# Patient Record
Sex: FEMALE | URBAN - METROPOLITAN AREA
[De-identification: names, ages, dates, MRNs, and addresses within clinical notes are randomized per-mention and may not be internally consistent; named-entity substitution may affect disease eponyms.]

---

## 2021-07-19 ENCOUNTER — HOSPITAL ENCOUNTER (OUTPATIENT)
Dept: PHYSICAL THERAPY | Age: 56
Discharge: HOME | End: 2021-07-19
Payer: COMMERCIAL

## 2021-07-19 DIAGNOSIS — M22.2X2 PATELLOFEMORAL PAIN SYNDROME OF LEFT KNEE: ICD-10-CM

## 2021-07-19 DIAGNOSIS — M25.562 ACUTE PAIN OF LEFT KNEE: ICD-10-CM

## 2021-07-19 PROCEDURE — 97161 PT EVAL LOW COMPLEX 20 MIN: CPT

## 2021-07-19 PROCEDURE — 97110 THERAPEUTIC EXERCISES: CPT

## 2021-07-19 NOTE — PROGRESS NOTES
Timothy Lerner  : 1965  Primary: Celi Koyanagi Of Zena Chau*  Secondary:  [facility] [address], [address] [facility], [address]  [phone]   [phone]       OUTPATIENT PHYSICAL THERAPY: Daily Treatment Note 2021  Visit Count:  1  ICD-10: Treatment Diagnosis: Pain in left knee (M25.562)   Difficulty in walking, not elsewhere classified (R26.2)  Muscle weakness (generalized) (M62.81)  Precautions/Allergies:   Patient has no known allergies. TREATMENT PLAN:  Effective Dates/Frequency/Duration: Twice per week from 2021 until 2021 (8 weeks). Pre-treatment Symptoms/Complaints:  See history above. Pain: Initial:   5/10 Post Session:  No change in pain reported   Medications Last Reviewed:  2021  Updated Objective Findings: See evaluation note from today   TREATMENT:   In addition to assessment today, the following treatments were provided:  Therapeutic Exercise: ( 9 minutes):  Exercises per grid below to improve mobility, strength and dynamic movement of left knee to improve functional mobility. Required minimal visual, verbal and manual cues to promote proper body alignment, promote proper body posture, promote proper body mechanics and promote proper body breathing techniques. Progressed resistance, range, repetitions and complexity of movement as indicated.     Date:  2021 Date:   Date:   Activity/Exercise Parameters Parameters    SLR in supine 10 reps/1 set each LE with cues to maintain knee extension throughout     Bridging in hooklying 10 reps/1 set with cues for slower eccentric lowering     Sidelying clamshells 10 reps/1 set each LE with cues for avoiding posterior lumbar rotation and for slow lowering                             *given in HEP  MedBridge Portal   Pt given following band colors: [] Yellow          [] Red          [] Ruby Granados          [] Blue          [] Brent Alvarado     Treatment/Session Summary:    Response to Treatment:  See assessment above. No adverse reactions/unusual changes observed/reported in clinical status this session. Communication/Consultation:  None today  Equipment provided today:  None today  Recommendations/Intent for next treatment session: Next visit will focus on manual therapy/modalities to reduce pain; kinesiotaping for medial patellar glide; strengthening/stretching surrounding L knee.     Total Treatment Billable Duration:  9 minutes  PT Patient Time In/Time Out  Time In: 1430  Time Out: 280 Melissa Yanez DPT    Future Appointments   Date Time Provider Gabrielle Malone   7/23/2021  3:15 PM Bradford Fried, [facility]   7/26/2021  2:30 PM Samanta Vee, [facility] [place]   7/29/2021  1:45 PM Samanta Vee, [facility]   8/2/2021  1:00 PM Bradford Fried, [facility]   8/5/2021  1:00 PM Cristina DAVIS, [facility]   8/30/2021  1:45 PM MD SANDIP Christianson        Visit Approval Visit # Therapist initials Date A NS / Cx < 24 hr >24 hr Cx Comments   30 visits total 1  7/19/2021 [x]  [] [] Initial evaluation       [] [] []        [] [] []        [] [] []        [] [] []        [] [] []        [] [] []        [] [] []        [] [] []        [] [] []        [] [] []        [] [] []        [] [] []        [] [] []        [] [] []        [] [] []        [] [] []        [] [] []

## 2021-07-23 ENCOUNTER — HOSPITAL ENCOUNTER (OUTPATIENT)
Dept: PHYSICAL THERAPY | Age: 56
Discharge: HOME | End: 2021-07-23
Payer: COMMERCIAL

## 2021-07-23 PROCEDURE — 97140 MANUAL THERAPY 1/> REGIONS: CPT

## 2021-07-23 PROCEDURE — 97110 THERAPEUTIC EXERCISES: CPT

## 2021-07-23 NOTE — PROGRESS NOTES
Mariela Tonny  : 1965  Primary: Jus Schoharie Of Zena Larykit*  Secondary:  [facility] [address] [place] [address] 68, 101 [facility] [place], [facility]  [phone]   [phone]       OUTPATIENT PHYSICAL THERAPY: Daily Treatment Note 2021  Visit Count:  2  ICD-10: Treatment Diagnosis: Pain in left knee (M25.562)   Difficulty in walking, not elsewhere classified (R26.2)  Muscle weakness (generalized) (M62.81)  Precautions/Allergies:   Patient has no known allergies. TREATMENT PLAN:  Effective Dates/Frequency/Duration: Twice per week from 2021 until 2021 (8 weeks). Pre-treatment Symptoms/Complaints:  Pt states she has been on a regimen of taking aleve every day for a week per MD orders  Pain: Initial:   5/10 Post Session: no number verbalized, but pt reporting less pain   Medications Last Reviewed:  2021  Updated Objective Findings: None Today   TREATMENT:   Therapeutic Exercise: ( 25 minutes):  Exercises per grid below to improve mobility, strength and dynamic movement of left knee to improve functional mobility. Required minimal visual, verbal and manual cues to promote proper body alignment, promote proper body posture, promote proper body mechanics and promote proper body breathing techniques. Progressed resistance, range, repetitions and complexity of movement as indicated.     Date:  2021 Date:  2021 Date:   Activity/Exercise Parameters Parameters    SLR in supine* 10 reps/1 set each LE with cues to maintain knee extension throughout 20 reps/1 set each LE with cues to maintain knee extension throughout    Bridging in hooklying* 10 reps/1 set with cues for slower eccentric lowering     Sidelying clamshells* 10 reps/1 set each LE with cues for avoiding posterior lumbar rotation and for slow lowering     Ankle plantarflexor stretch on incline board  30 second hold x 3 reps with knees extended then flexed    Nustep   L4 resistance for 10 minutes with B LEs only to increase strength/endurance; cues to avoid excessive hip IR at L to reduce L knee pain    Sidelying hip abduction*  20 reps/1 set each LE with cues for maintaining knee extension but avoiding knee hyperextension with cues to avoid hip ER as well    Prone hip extension*  20 reps/1 set each LE with cues for slow movement and avoiding bringing hip up off of bed                            *given in HEP  MedBridge Portal   Pt given following band colors: [] Yellow          [] Red          [] Green          [] Blue          [] Grey     Manual therapy (15 minutes): With pt in supported supine position:   - applied kinesiotape to L knee with medial patellar glide in horseshoe position as well as horizontal piece of tape across middle of knee (50-75% stretch) to reduce pain and facilitate improved alignment of patella  With pt in supported R sidelying position:   - STM and connective tissue mobilization with green theraflex roller to L ITB and TFL as well as to portions of L quad and hamstring to reduce muscular tightness and pain    Treatment/Session Summary:    Response to Treatment:  Pt reporting less strain at L knee after medial patellar taping was performed this session. Gave pt updated HEP to include new exercises. Pt reporting good understanding of exercises. No adverse reactions/unusual changes observed/reported in clinical status this session. Communication/Consultation:  None today  Equipment provided today:  None today  Recommendations/Intent for next treatment session: Next visit will focus on manual therapy/modalities to reduce pain; kinesiotaping for medial patellar glide; strengthening/stretching surrounding L knee.     Total Treatment Billable Duration:  40 minutes  PT Patient Time In/Time Out  Time In: 9641  Time Out: 100 [facility], DPT    Future Appointments   Date Time Provider Gabrielle Malone   7/26/2021  2:30 PM Helga Ferrari PTA [facility] 7/29/2021  1:45 PM Lashaun Dawson PTA [facility]   8/2/2021  1:00 PM Joana Chow DPT [facility]   8/5/2021  1:00 PM Ray DAVIS DPT [facility]   8/30/2021  1:45 PM Liborio Murillo MD [facility] POA        Visit Approval Visit # Therapist initials Date A NS / Cx < 24 hr >24 hr Cx Comments   30 visits total 1  7/19/2021 [x]  [] [] Initial evaluation    2  7/23/2021 [x] [] []        [] [] []        [] [] []        [] [] []        [] [] []        [] [] []        [] [] []        [] [] []        [] [] []        [] [] []        [] [] []        [] [] []        [] [] []        [] [] []        [] [] []        [] [] []        [] [] []

## 2021-07-26 ENCOUNTER — HOSPITAL ENCOUNTER (OUTPATIENT)
Dept: PHYSICAL THERAPY | Age: 56
Discharge: HOME | End: 2021-07-26
Payer: COMMERCIAL

## 2021-07-26 PROCEDURE — 97140 MANUAL THERAPY 1/> REGIONS: CPT

## 2021-07-26 PROCEDURE — 97110 THERAPEUTIC EXERCISES: CPT

## 2021-07-26 NOTE — PROGRESS NOTES
Es Harmon  : 1965  Primary: Chandana Loosen Of Zena Chau*  Secondary:  [facility]  [address], [address] [place] [address], [place] [address] [address] [facility]  [phone]   [phone]       OUTPATIENT PHYSICAL THERAPY: Daily Treatment Note 2021  Visit Count:  3  ICD-10: Treatment Diagnosis: Pain in left knee (M25.562)   Difficulty in walking, not elsewhere classified (R26.2)  Muscle weakness (generalized) (M62.81)  Precautions/Allergies:   Patient has no known allergies. TREATMENT PLAN:  Effective Dates/Frequency/Duration: Twice per week from 2021 until 2021 (8 weeks). Pre-treatment Symptoms/Complaints:  Patient states tape helped over the weekend. Pain: Initial:   4/10 Post Session: no number verbalized, but pt reporting less pain   Medications Last Reviewed:  2021  Updated Objective Findings: None Today   TREATMENT:   Therapeutic Exercise: ( 25 minutes):  Exercises per grid below to improve mobility, strength and dynamic movement of left knee to improve functional mobility. Required minimal visual, verbal and manual cues to promote proper body alignment, promote proper body posture, promote proper body mechanics and promote proper body breathing techniques. Progressed resistance, range, repetitions and complexity of movement as indicated.     Date:  2021 Date:  2021 Date:  2021   Activity/Exercise Parameters Parameters    SLR in supine* 10 reps/1 set each LE with cues to maintain knee extension throughout 20 reps/1 set each LE with cues to maintain knee extension throughout X 20 B with good quad set    Bridging in hooklying* 10 reps/1 set with cues for slower eccentric lowering  2 x 10 with yellow band to keep patellar alignment    Sidelying clamshells* 10 reps/1 set each LE with cues for avoiding posterior lumbar rotation and for slow lowering  2 x 10 B with yellow band    Ankle plantarflexor stretch on incline board  30 second hold x 3 reps with knees extended then flexed 3 x 30 sec hold with knees extended    Nustep   L4 resistance for 10 minutes with B LEs only to increase strength/endurance; cues to avoid excessive hip IR at L to reduce L knee pain L4 resistance for 10 minutes with B LEs only to increase strength/endurance; cues to avoid excessive hip IR at L to reduce L knee pain   Sidelying hip abduction*  20 reps/1 set each LE with cues for maintaining knee extension but avoiding knee hyperextension with cues to avoid hip ER as well    Prone hip extension*  20 reps/1 set each LE with cues for slow movement and avoiding bringing hip up off of bed                            *given in HEP  MedBridge Portal   Pt given following band colors: [] Yellow          [] Red          [] Green          [] Blue          [] Grey     Manual therapy (30 minutes): With pt in supported supine position:(at end of session assisted by Carmen Ochoa DPT )   - applied kinesiotape to L knee with medial patellar glide in horseshoe position as well as horizontal piece of tape across middle of knee (50-75% stretch) to reduce pain and facilitate improved alignment of patella  With pt in supported R sidelying position:   - STM and connective tissue mobilization with hands to L ITB and TFL as well as to portions of L quad and hamstring to reduce muscular tightness and pain. Some cross friction and kneading required to decrease tightness. Treatment/Session Summary:    Response to Treatment:  Patient encouraged to be aware of patellar alignment with activities. Added yellow band to the exercises as listed above. Pt reporting good understanding of exercises. No adverse reactions/unusual changes observed/reported in clinical status this session.   Communication/Consultation:  None today  Equipment provided today:  None today  Recommendations/Intent for next treatment session: Next visit will focus on manual therapy/modalities to reduce pain; kinesiotaping for medial patellar glide; strengthening/stretching surrounding L knee.     Total Treatment Billable Duration:  55 minutes  PT Patient Time In/Time Out  Time In: 1900  Time Out: [address] [place], [place]    Future Appointments   Date Time Provider Gabrielle Malone   7/29/2021  1:45 PM Dago Millan [facility]   8/2/2021  1:00 PM PATRIC Arrington [facility]   8/5/2021  1:00 PM PATRIC Arrington   8/9/2021  [facility]   8/12/2021  9:30 AM Brenda Jimenez DPT [facility]   8/30/2021  1:45 PM Braydon Schmidt MD [facility]        Visit Approval Visit # Therapist initials Date A NS / Cx < 24 hr >24 hr Cx Comments   30 visits total 1  7/19/2021 [x]  [] [] Initial evaluation    2  7/23/2021 [x] [] []     3 PDE 7- [x] [] []        [] [] []        [] [] []        [] [] []        [] [] []        [] [] []        [] [] []        [] [] []        [] [] []        [] [] []        [] [] []        [] [] []        [] [] []        [] [] []        [] [] []        [] [] []

## 2021-07-29 ENCOUNTER — HOSPITAL ENCOUNTER (OUTPATIENT)
Dept: PHYSICAL THERAPY | Age: 56
Discharge: HOME | End: 2021-07-29
Payer: COMMERCIAL

## 2021-07-29 PROCEDURE — 97140 MANUAL THERAPY 1/> REGIONS: CPT

## 2021-07-29 PROCEDURE — 97110 THERAPEUTIC EXERCISES: CPT

## 2021-07-29 NOTE — PROGRESS NOTES
Naldo Bowersseveriano  : 1965  Primary: Ivette Jasso Of Zena Chau*  [facility]:  [facility], [address] [facility], [place] [address] [address] [facility]  [phone]   [phone]       OUTPATIENT PHYSICAL THERAPY: Daily Treatment Note 2021  Visit Count:  4  ICD-10: Treatment Diagnosis: Pain in left knee (M25.562)   Difficulty in walking, not elsewhere classified (R26.2)  Muscle weakness (generalized) (M62.81)  Precautions/Allergies:   Patient has no known allergies. TREATMENT PLAN:  Effective Dates/Frequency/Duration: Twice per week from 2021 until 2021 (8 weeks). Pre-treatment Symptoms/Complaints:  Patient reports she is aggravated due to the last 2 days she has had constant pain. She reports she is doing her exercises and some house work, but no extra walking. Pain: Initial:   5/10 Post Session: no number verbalized, but pt reporting less pain   Medications Last Reviewed:  2021  Updated Objective Findings: None Today   TREATMENT:   Therapeutic Exercise: ( 30 minutes):  Exercises per grid below to improve mobility, strength and dynamic movement of left knee to improve functional mobility. Required minimal visual, verbal and manual cues to promote proper body alignment, promote proper body posture, promote proper body mechanics and promote proper body breathing techniques. Progressed resistance, range, repetitions and complexity of movement as indicated.     Date:  2021 Date:  2021 Date:  2021 Date:  2021   Activity/Exercise Parameters Parameters     SLR in supine* 10 reps/1 set each LE with cues to maintain knee extension throughout 20 reps/1 set each LE with cues to maintain knee extension throughout X 20 B with good quad set  X 20 B  With good quad set    Bridging in hooklying* 10 reps/1 set with cues for slower eccentric lowering  2 x 10 with yellow band to keep patellar alignment  X 20    Sidelying clamshells* 10 reps/1 set each LE with cues for avoiding posterior lumbar rotation and for slow lowering  2 x 10 B with yellow band  X 20 B   Ankle plantarflexor stretch on incline board  30 second hold x 3 reps with knees extended then flexed 3 x 30 sec hold with knees extended  3 x 30 sec hold with knees extended    Nustep   L4 resistance for 10 minutes with B LEs only to increase strength/endurance; cues to avoid excessive hip IR at L to reduce L knee pain L4 resistance for 10 minutes with B LEs only to increase strength/endurance; cues to avoid excessive hip IR at L to reduce L knee pain Level 3  10 minutes with B LE's only to increase strength and endurance    Sidelying hip abduction*  20 reps/1 set each LE with cues for maintaining knee extension but avoiding knee hyperextension with cues to avoid hip ER as well  X 20 B   Prone hip extension*  20 reps/1 set each LE with cues for slow movement and avoiding bringing hip up off of bed     TKE    Yellow   X 10 L                        *given in HEP  MedBridge Portal   Pt given following band colors: [] Yellow          [] Red          [] Green          [] Blue          [] Grey     Manual therapy (27 minutes): With pt in supported supine position:   - applied kinesiotape to L knee with medial patellar glide in horseshoe position as well as horizontal piece of tape across middle of knee (50-75% stretch) to reduce pain and facilitate improved alignment of patella  With pt in supported R sidelying position:   - STM and connective tissue mobilization with hands to L anterior and medial aspect of her knee to reduce muscular tightness and pain. Some cross friction and kneading required to decrease tightness. Treatment/Session Summary:    Response to Treatment:  Patient encouraged to be aware of L LE when doing house work. She did report less pain and soreness upon departure. Pt reporting good understanding of exercises.         No adverse reactions/unusual changes observed/reported in clinical status this session. Communication/Consultation:  None today  Equipment provided today:  None today  Recommendations/Intent for next treatment session: Next visit will focus on manual therapy/modalities to reduce pain; kinesiotaping for medial patellar glide; strengthening/stretching surrounding L knee.     Total Treatment Billable Duration:  57 minutes  PT Patient Time In/Time Out  Time In: 5839  Time Out: 541 [facility]    Future Appointments   Date Time Provider Gabrielle Kira   8/2/2021  1:00 PM Pat Sorensen DPT [facility]   8/5/2021  1:00 PM Pat Sorensen DPT [facility]   8/9/2021  1:00 PM Chloe Lundberg PTA [facility]   8/12/2021  9:30 AM Pat Sorensen DPT [facility]   8/30/2021  1:45 PM MD SANDIP Ugarte        Visit Approval Visit # Therapist initials Date A NS / Cx < 24 hr >24 hr Cx Comments   30 visits total 1  7/19/2021 [x]  [] [] Initial evaluation    2  7/23/2021 [x] [] []     3 PDE 7- [x] [] []     4 PDE 7- [] [] []        [] [] []        [] [] []        [] [] []        [] [] []        [] [] []        [] [] []        [] [] []        [] [] []        [] [] []        [] [] []        [] [] []        [] [] []        [] [] []        [] [] []

## 2021-08-02 ENCOUNTER — HOSPITAL ENCOUNTER (OUTPATIENT)
Dept: PHYSICAL THERAPY | Age: 56
Discharge: HOME | End: 2021-08-02
Payer: COMMERCIAL

## 2021-08-02 PROCEDURE — 97140 MANUAL THERAPY 1/> REGIONS: CPT

## 2021-08-02 PROCEDURE — 97110 THERAPEUTIC EXERCISES: CPT

## 2021-08-02 NOTE — PROGRESS NOTES
Keira Brianna  : 1965  Primary: Marley Dc Of Zena Chau*  [facility]:  [facility] [address] [facility], [place] [address] [address] [facility]  [phone]   [phone]       OUTPATIENT PHYSICAL THERAPY: Daily Treatment Note 2021  Visit Count:  5  ICD-10: Treatment Diagnosis: Pain in left knee (M25.562)   Difficulty in walking, not elsewhere classified (R26.2)  Muscle weakness (generalized) (M62.81)  Precautions/Allergies:   Patient has no known allergies. TREATMENT PLAN:  Effective Dates/Frequency/Duration: Twice per week from 2021 until 2021 (8 weeks). Pre-treatment Symptoms/Complaints:  Patient reports she has been feeling better since last session - states she has been taking it easy, icing a lot, and using a pillow between her knees when she sleeps  Pain: Initial:   3/10 Post Session: no number verbalized   Medications Last Reviewed:  2021  Updated Objective Findings: None Today   TREATMENT:   Therapeutic Exercise: ( 28 minutes):  Exercises per grid below to improve mobility, strength and dynamic movement of left knee to improve functional mobility. Required minimal visual, verbal and manual cues to promote proper body alignment, promote proper body posture, promote proper body mechanics and promote proper body breathing techniques. Progressed resistance, range, repetitions and complexity of movement as indicated.     Date:  2021 Date:  2021 Date:  2021 Date:  2021   Activity/Exercise Parameters      SLR in supine* 20 reps/1 set each LE with cues to maintain knee extension throughout X 20 B with good quad set  X 20 B  With good quad set  No weight quickly progressing to 1.5 ankle weight; 20 reps/1 set L LE with cues for slow lowering   Bridging in hooklying*  2 x 10 with yellow band to keep patellar alignment  X 20  15 reps/1 set with B LEs on green exercise ball for increased challenge   Sidelying clamshells*  2 x 10 B with yellow band  X 20 B    Ankle plantarflexor stretch on incline board 30 second hold x 3 reps with knees extended then flexed 3 x 30 sec hold with knees extended  3 x 30 sec hold with knees extended  3 x 30 sec hold with knees extended    Nustep  L4 resistance for 10 minutes with B LEs only to increase strength/endurance; cues to avoid excessive hip IR at L to reduce L knee pain L4 resistance for 10 minutes with B LEs only to increase strength/endurance; cues to avoid excessive hip IR at L to reduce L knee pain Level 3  10 minutes with B LE's only to increase strength and endurance  L4 resistance for 10 minutes with B LEs only to increase strength/endurance; cues to avoid excessive hip IR at L to reduce L knee pain   Sidelying hip abduction* 20 reps/1 set each LE with cues for maintaining knee extension but avoiding knee hyperextension with cues to avoid hip ER as well  X 20 B    Prone hip extension* 20 reps/1 set each LE with cues for slow movement and avoiding bringing hip up off of bed   20 reps/1 set L LE with cues for slow movement and avoiding bringing hip up off of bed   TKE   Yellow   X 10 L    LAQ in sitting    2 lb ankle weight; 20 reps/1 set L LE with cues to avoid hyperextension; cues for slow lowering   Reverse clamshells in sidelying*    20 reps/1 set L LE with cues for slow lowering   Leg press machine    15 lb resistance; 25 reps/1 set with cues to avoid hip IR throughout                        *given in HEP  MedBridge Portal   Pt given following band colors: [] Yellow          [] Red          [] Green          [] Blue          [] Grey     Manual therapy (12 minutes):   With pt in supported supine position:   - applied kinesiotape to anterior L knee with medial patellar glide in horseshoe position as well as horizontal piece of tape across middle of knee (50-75% stretch) to reduce pain and facilitate improved alignment of patella  With pt in supported prone position:   - applied kinesiotape to posterior L knee in X position at 25% stretch to encourage improved alignment of L knee (less hyperextension)     Treatment/Session Summary:    Response to Treatment:  Patient reporting less pain overall this session. She is demonstrating improved technique with LE strengthening exercises with less L knee hyperextension noted, and is able to perform more challenging exercises as well with no reports of increased pain. Will continue to progress LE strengthening as tolerated by pt. No adverse reactions/unusual changes observed/reported in clinical status this session. Communication/Consultation:  None today  Equipment provided today:  None today  Recommendations/Intent for next treatment session: Next visit will focus on manual therapy/modalities to reduce pain; kinesiotaping for medial patellar glide; strengthening/stretching surrounding L knee.     Total Treatment Billable Duration:  40 minutes  PT Patient Time In/Time Out  Time In: [facility]  Time Out: [address] [place] [address] PATRIC Miller    Future Appointments   Date Time Provider Gabrielle Malone   8/5/2021  1:00 PM Koko Espinoza DPT [facility]   8/9/2021  1:00 PM Guillermo Harper PTA [facility]   8/12/2021  9:30 AM PATRIC Singh   8/16/2021  1:00 PM PATRIC Singh   8/19/2021  1:00 PM Guillermo Harper [facility]   8/30/2021  1:45 PM Raul Song MD [facility] POA        Visit Approval Visit # Therapist initials Date A NS / Cx < 24 hr >24 hr Cx Comments   30 visits total 1  7/19/2021 [x]  [] [] Initial evaluation    2  7/23/2021 [x] [] []     3 PDE 7- [x] [] []     4 PDE 7- [x] [] []     5  8/2/2021 [x] [] []        [] [] []        [] [] []        [] [] []        [] [] []        [] [] []        [] [] []        [] [] []        [] [] []        [] [] []        [] [] []        [] [] []        [] [] []        [] [] []

## 2021-08-05 ENCOUNTER — HOSPITAL ENCOUNTER (OUTPATIENT)
Dept: PHYSICAL THERAPY | Age: 56
Discharge: HOME | End: 2021-08-05
Payer: COMMERCIAL

## 2021-08-05 PROCEDURE — 97110 THERAPEUTIC EXERCISES: CPT

## 2021-08-05 NOTE — PROGRESS NOTES
Ulises Calhoun  : 1965  Primary: Ze Martin Of Zena Chau*  Secondary:  [facility] [address], [place], [facility]  [phone]   [phone]       OUTPATIENT PHYSICAL THERAPY: Daily Treatment Note 2021  Visit Count:  6  ICD-10: Treatment Diagnosis: Pain in left knee (M25.562)   Difficulty in walking, not elsewhere classified (R26.2)  Muscle weakness (generalized) (M62.81)  Precautions/Allergies:   Patient has no known allergies. TREATMENT PLAN:  Effective Dates/Frequency/Duration: Twice per week from 2021 until 2021 (8 weeks). Pre-treatment Symptoms/Complaints:  Patient states she feels about the same today as she did earlier this week  Pain: Initial:   3/10 Post Session: pt stating no change in pain   Medications Last Reviewed:  2021  Updated Objective Findings: None Today   TREATMENT:   Therapeutic Exercise: ( 40 minutes):  Exercises per grid below to improve mobility, strength and dynamic movement of left knee to improve functional mobility. Required minimal visual, verbal and manual cues to promote proper body alignment, promote proper body posture, promote proper body mechanics and promote proper body breathing techniques. Progressed resistance, range, repetitions and complexity of movement as indicated.     Date:  2021 Date:  2021 Date:  2021 Date:  2021   Activity/Exercise       SLR in supine* X 20 B with good quad set  X 20 B  With good quad set  No weight quickly progressing to 1.5 ankle weight; 20 reps/1 set L LE with cues for slow lowering 2 ankle weight; 20 reps/1 set L LE with cues for slow lowering; cues to avoid hyperextension at L knee   Bridging in hooklying* 2 x 10 with yellow band to keep patellar alignment  X 20  15 reps/1 set with B LEs on green exercise ball for increased challenge 20 reps/1 set with B LEs on green exercise ball for increased challenge and to avoid hip IR Sidelying clamshells* 2 x 10 B with yellow band  X 20 B     Ankle plantarflexor stretch on incline board 3 x 30 sec hold with knees extended  3 x 30 sec hold with knees extended  3 x 30 sec hold with knees extended  3 x 30 sec hold with knees extended; 3rd notch from bottom   Nustep  L4 resistance for 10 minutes with B LEs only to increase strength/endurance; cues to avoid excessive hip IR at L to reduce L knee pain Level 3  10 minutes with B LE's only to increase strength and endurance  L4 resistance for 10 minutes with B LEs only to increase strength/endurance; cues to avoid excessive hip IR at L to reduce L knee pain L5 resistance for 10 minutes with B LEs only to increase strength/endurance   Sidelying hip abduction*  X 20 B     Prone hip extension*   20 reps/1 set L LE with cues for slow movement and avoiding bringing hip up off of bed    TKE  Yellow   X 10 L     LAQ in sitting   2 lb ankle weight; 20 reps/1 set L LE with cues to avoid hyperextension; cues for slow lowering    Reverse clamshells in sidelying*   20 reps/1 set L LE with cues for slow lowering    Leg press machine   15 lb resistance; 25 reps/1 set with cues to avoid hip IR throughout    Hamstring stretch in supine*    30 second hold x 3 reps/1 set each LE; pt stretching with strap   Jose test stretch for hip flexors/quads*    30 second hold x 1 rep/1 set each LE; pt providing passive overpressure with strap   Hip abductor stretch in supine*    30 second hold x 3 reps/1 set; pt stretching with strap                               *given in HEP  Med[employer] Portal   Pt given following band colors: [] Yellow          [] Red          [] Green          [] Blue          [] Grey     Manual therapy (5 minutes):   With pt in supported supine position:   - applied kinesiotape to anterior L knee with medial patellar glide in horseshoe position as well as horizontal piece of tape across middle of knee (75-80% stretch) to reduce pain and facilitate improved alignment of patella    Treatment/Session Summary:    Response to Treatment:  Patient demonstrating improved ability to perform more resistance with above listed strengthening exercises with good technique this session. Gave pt HEP of stretches to work on at home. No adverse reactions/unusual changes observed/reported in clinical status this session. Communication/Consultation:  None today  Equipment provided today:  None today  Recommendations/Intent for next treatment session: Next visit will focus on manual therapy/modalities to reduce pain; kinesiotaping for medial patellar glide; strengthening/stretching surrounding L knee.     Total Treatment Billable Duration:  45 minutes  PT Patient Time In/Time Out  Time In: 6899  Time Out: 221 [facility], DPT    Future Appointments   Date Time Provider Gabrielle Malone   8/9/2021  1:00 PM Samanta Vee [facility] Darcy Chen   8/12/2021  9:30 AM PATRIC Molina   8/16/2021  1:00 PM PATRIC Molina   8/19/2021  1:00 [facility]   8/30/2021  1:45 PM Tuan Fu MD [facility] POA        Visit Approval Visit # Therapist initials Date A NS / Cx < 24 hr >24 hr Cx Comments   30 visits total 1  7/19/2021 [x]  [] [] Initial evaluation    2  7/23/2021 [x] [] []     3 PDE 7- [x] [] []     4 PDE 7- [x] [] []     5  8/2/2021 [x] [] []     6  8/5/2021 [x] [] []        [] [] []        [] [] []        [] [] []        [] [] []        [] [] []        [] [] []        [] [] []        [] [] []        [] [] []        [] [] []        [] [] []        [] [] []

## 2021-08-09 ENCOUNTER — HOSPITAL ENCOUNTER (OUTPATIENT)
Dept: PHYSICAL THERAPY | Age: 56
Discharge: HOME | End: 2021-08-09
Payer: COMMERCIAL

## 2021-08-09 PROCEDURE — 97140 MANUAL THERAPY 1/> REGIONS: CPT

## 2021-08-09 PROCEDURE — 97110 THERAPEUTIC EXERCISES: CPT

## 2021-08-09 NOTE — PROGRESS NOTES
Damir Hsu  : 1965  Primary: Meli Boby Of Zena Chau*  Secondary:  [facility], [address], [place], [facility]  [phone]   [phone]       OUTPATIENT PHYSICAL THERAPY: Daily Treatment Note 2021  Visit Count:  7  ICD-10: Treatment Diagnosis: Pain in left knee (M25.562)   Difficulty in walking, not elsewhere classified (R26.2)  Muscle weakness (generalized) (M62.81)  Precautions/Allergies:   Patient has no known allergies. TREATMENT PLAN:  Effective Dates/Frequency/Duration: Twice per week from 2021 until 2021 (8 weeks). Pre-treatment Symptoms/Complaints:  Patient states she still has moments of pain when getting in an out of the car/truck. Pain: Initial:  3/10 Post Session: 3/10. Medications Last Reviewed:  2021  Updated Objective Findings: None Today   TREATMENT:   Therapeutic Exercise: (  25 minutes):  Exercises per grid below to improve mobility, strength and dynamic movement of left knee to improve functional mobility. Required minimal visual, verbal and manual cues to promote proper body alignment, promote proper body posture, promote proper body mechanics and promote proper body breathing techniques. Progressed resistance, range, repetitions and complexity of movement as indicated.     Date:  2021 Date:  2021 Date:  2021 Date:  2021 Date:  2021   Activity/Exercise        SLR in supine* X 20 B with good quad set  X 20 B  With good quad set  No weight quickly progressing to 1.5 ankle weight; 20 reps/1 set L LE with cues for slow lowering 2 ankle weight; 20 reps/1 set L LE with cues for slow lowering; cues to avoid hyperextension at L knee    Bridging in hooklying* 2 x 10 with yellow band to keep patellar alignment  X 20  15 reps/1 set with B LEs on green exercise ball for increased challenge 20 reps/1 set with B LEs on green exercise ball for increased challenge and to avoid hip IR    Sidelying clamshells* 2 x 10 B with yellow band  X 20 B      Ankle plantarflexor stretch on incline board 3 x 30 sec hold with knees extended  3 x 30 sec hold with knees extended  3 x 30 sec hold with knees extended  3 x 30 sec hold with knees extended; 3rd notch from bottom    Nustep  L4 resistance for 10 minutes with B LEs only to increase strength/endurance; cues to avoid excessive hip IR at L to reduce L knee pain Level 3  10 minutes with B LE's only to increase strength and endurance  L4 resistance for 10 minutes with B LEs only to increase strength/endurance; cues to avoid excessive hip IR at L to reduce L knee pain L5 resistance for 10 minutes with B LEs only to increase strength/endurance 10 minutes   Level 5   Sidelying hip abduction*  X 20 B      Prone hip extension*   20 reps/1 set L LE with cues for slow movement and avoiding bringing hip up off of bed     TKE  Yellow   X 10 L      LAQ in sitting   2 lb ankle weight; 20 reps/1 set L LE with cues to avoid hyperextension; cues for slow lowering  SAQ x 15  With red band - pushing out - x 10    with red ball - squeezing in - x 10    Reverse clamshells in sidelying*   20 reps/1 set L LE with cues for slow lowering     Leg press machine   15 lb resistance; 25 reps/1 set with cues to avoid hip IR throughout     Hamstring stretch in supine*    30 second hold x 3 reps/1 set each LE; pt stretching with strap    Jose test stretch for hip flexors/quads*    30 second hold x 1 rep/1 set each LE; pt providing passive overpressure with strap    Hip abductor stretch in supine*    30 second hold x 3 reps/1 set; pt stretching with strap                                    *given in HEP  MedBridge Portal   Pt given following band colors: [] Yellow          [] Red          [] Green          [] Blue          [] Grey     Manual therapy (28 minutes):  Looked and and adjusted new sneakers with insert and spoke with her at length regarding support in shoe and wearing shoes at home. Supine for STM to quads, patellar mobilization, and decreasing edema - notable tightness, but able to increase lateral and medial mobility with no pain. Patient stated it was sore. With pt in supported supine position:   - applied kinesiotape to anterior L knee with medial patellar glide in horseshoe position as well as horizontal piece of tape across middle of knee (70% stretch) to reduce pain and facilitate improved alignment of patella    Treatment/Session Summary:    Response to Treatment:  Patient demonstrating improved ability for ROM. Did talk with patient regarding wearing some supportive type of shoes other times at home. Also encouraged her to do HEP twice a day and continue with cold pack. No adverse reactions/unusual changes observed/reported in clinical status this session. Communication/Consultation:  None today  Equipment provided today:  None today  Recommendations/Intent for next treatment session: Next visit will focus on manual therapy/modalities to reduce pain; kinesiotaping for medial patellar glide; strengthening/stretching surrounding L knee.     Total Treatment Billable Duration:  53 minutes  PT Patient Time In/Time Out  Time In: 4376  Time Out: 1200 Marilin Haider PTA    Future Appointments   Date Time Provider Gabrielle Malone   8/12/2021  9:30 AM Leila Christine DPT [facility]   8/16/2021  1:00 PM PATRIC Dee Junior GREGORIA   8/19/2021  1:00 PM Margaret Quinn PTA [facility]   8/30/2021  1:45 PM Pedro Han MD [facility] POA        Visit Approval Visit # Therapist initials Date A NS / Cx < 24 hr >24 hr Cx Comments   30 visits total 1  7/19/2021 [x]  [] [] Initial evaluation    2  7/23/2021 [x] [] []     3 PDE 7- [x] [] []     4 PDE 7- [x] [] []     5  8/2/2021 [x] [] []     6  8/5/2021 [x] [] []     7 PDE 8-9-2021 [x] [] []        [] [] []        [] [] []        [] [] []        [] [] []        [] [] []        [] [] []        [] [] []        [] [] []        [] [] []        [] [] []        [] [] []

## 2021-08-16 ENCOUNTER — HOSPITAL ENCOUNTER (OUTPATIENT)
Dept: PHYSICAL THERAPY | Age: 56
Discharge: HOME | End: 2021-08-16
Payer: COMMERCIAL

## 2021-08-16 PROCEDURE — 97110 THERAPEUTIC EXERCISES: CPT

## 2021-08-16 PROCEDURE — 97140 MANUAL THERAPY 1/> REGIONS: CPT

## 2021-08-16 NOTE — PROGRESS NOTES
Andrea Akers  : 1965  Primary:   Secondary:  [address] at [facility]  Sammy [address], [address], [place], [facility]  [phone]   [phone]       OUTPATIENT PHYSICAL THERAPY: Daily Treatment Note 2021  Visit Count:  8       ICD-10: Treatment Diagnosis: Pain in left knee (M25.562)   Difficulty in walking, not elsewhere classified (R26.2)  Muscle weakness (generalized) (M62.81)  Precautions/Allergies:   Patient has no known allergies. TREATMENT PLAN:  Effective Dates/Frequency/Duration: Twice per week from 2021 until 2021 (8 weeks). Pre-treatment Symptoms/Complaints:  Patient states she has been feeling less pain on her L LE but now is having some pain on the R side; pt has new shoes on that have inserts in them  Pain: Initial:  2/10 (pt states now it only hurts more if she twists on her knee vs keeping it straight) Post Session: no pain reported    Medications Last Reviewed:  2021  Updated Objective Findings: See evaluation note from today   TREATMENT:   Therapeutic Exercise: ( 30 minutes):  Exercises per grid below (and assessment in chart on 2021) to improve mobility, strength and dynamic movement of left knee to improve functional mobility. Required minimal visual, verbal and manual cues to promote proper body alignment, promote proper body posture, promote proper body mechanics and promote proper body breathing techniques. Progressed resistance, range, repetitions and complexity of movement as indicated.     Date:  2021 Date:  2021 Date:  2021 Date:  2021 Date:  2021   Activity/Exercise        SLR in supine* X 20 B  With good quad set  No weight quickly progressing to 1.5 ankle weight; 20 reps/1 set L LE with cues for slow lowering 2 ankle weight; 20 reps/1 set L LE with cues for slow lowering; cues to avoid hyperextension at L knee     Bridging in hooklying* X 20  15 reps/1 set with B LEs on green exercise ball for increased challenge 20 reps/1 set with B LEs on green exercise ball for increased challenge and to avoid hip IR     Sidelying clamshells* X 20 B       Ankle plantarflexor stretch on incline board 3 x 30 sec hold with knees extended  3 x 30 sec hold with knees extended  3 x 30 sec hold with knees extended; 3rd notch from bottom     Nustep  Level 3  10 minutes with B LE's only to increase strength and endurance  L4 resistance for 10 minutes with B LEs only to increase strength/endurance; cues to avoid excessive hip IR at L to reduce L knee pain L5 resistance for 10 minutes with B LEs only to increase strength/endurance 10 minutes   Level 5 L5 resistance for 10 minutes with B LEs only to increase strength/endurance   Sidelying hip abduction* X 20 B       Prone hip extension*  20 reps/1 set L LE with cues for slow movement and avoiding bringing hip up off of bed      TKE Yellow   X 10 L       LAQ in sitting  2 lb ankle weight; 20 reps/1 set L LE with cues to avoid hyperextension; cues for slow lowering  SAQ x 15  With red band - pushing out - x 10    with red ball - squeezing in - x 10     Reverse clamshells in sidelying*  20 reps/1 set L LE with cues for slow lowering      Leg press machine  15 lb resistance; 25 reps/1 set with cues to avoid hip IR throughout   15 lb resistance; 20 reps/2 sets with cues to avoid hip IR throughout   Hamstring stretch in supine*   30 second hold x 3 reps/1 set each LE; pt stretching with strap     Jose test stretch for hip flexors/quads*   30 second hold x 1 rep/1 set each LE; pt providing passive overpressure with strap     Hip abductor stretch in supine*   30 second hold x 3 reps/1 set; pt stretching with strap     Varying resistance at each LE in multiple different planes of motion     Per strength assessment in chart                           *given in HEP  MedBridge Portal   Pt given following band colors: [] Yellow          [] Red          [] Green          [] Blue [] [place]     Manual therapy (8 minutes): With pt in supported supine position:   - applied kinesiotape to anterior L knee with medial patellar glide in horseshoe position as well as horizontal piece of tape across middle of knee (75% stretch) to reduce pain and facilitate improved alignment of patella    Treatment/Session Summary:    Response to Treatment:  Progress note today - see assessment in chart. Educated pt on kinesiotaping method this session to improve her ability to start to perform at home if needed. No adverse reactions/unusual changes observed/reported in clinical status this session. Communication/Consultation:  None today  Equipment provided today:  None today  Recommendations/Intent for next treatment session: Next visit will focus on manual therapy/modalities to reduce pain; kinesiotaping for medial patellar glide; strengthening/stretching surrounding L knee.     Total Treatment Billable Duration:  39 minutes  PT Patient Time In/Time Out  Time In: 1300  Time Out: 20 [facility]    Future Appointments   Date Time Provider Gabrielle Malone   8/19/2021  1:00 PM Lindsay Zamora PTA [facility]   8/23/2021 10:15 AM PATRIC GuthrieDOJAMISON GREGORIA   8/26/2021  1:00 PM Linda Pacheco J[facility] SFD   8/30/2021  1:45 PM MD SANDIP Maldonado   8/31/2021  1:00 PM Linda Pacheco JENN [facility]   9/2/2021  1:00 PM Linda Pacheco [facility] SFD        Visit Approval Visit # Therapist initials Date A NS / Cx < 24 hr >24 hr Cx Comments   30 visits total 1  7/19/2021 [x]  [] [] Initial evaluation    2  7/23/2021 [x] [] []     3 PDE 7- [x] [] []     4 PDE 7- [x] [] []     5  8/2/2021 [x] [] []     6  8/5/2021 [x] [] []     7 PDE 8-9-2021 [x] [] []       8/12/2021 [] [] [x]     8  8/16/2021 [x] [] [] Progress note       [] [] []        [] [] []        [] [] []        [] [] []        [] [] []        [] [] []        [] [] []        [] [] []        [] [] []     /

## 2021-08-16 NOTE — PROGRESS NOTES
Mahi Mccauley  : 1965  Primary:   Secondary:  [address] at [facility]  [address], [address], [address]  [phone]   [phone]        OUTPATIENT PHYSICAL THERAPY:Progress Report 2021   ICD-10: Treatment Diagnosis: Pain in left knee (M25.562)   Difficulty in walking, not elsewhere classified (R26.2)  Muscle weakness (generalized) (M62.81)  Precautions/Allergies:   Patient has no known allergies. TREATMENT PLAN:  Effective Dates/Frequency/Duration: Twice per week from 2021 until 2021 (8 weeks). MEDICAL/REFERRING DIAGNOSIS:  Pain in left knee [M25.562]  Patellofemoral disorders, left knee [M22.2X2]   DATE OF ONSET: 2021  REFERRING PHYSICIAN: Olivia Bunn MD MD Orders: Evaluate and treat  Return MD Appointment: unspecified   ASSESSMENT:  Mahi Mccauley has now attended 8 physical therapy session for acute exacerbation of chronic L knee pain following her dog leaning on her L knee. This session, pt demonstrated slightly improved L LE strength in certain planes of motion, improved standing tolerance, and improved functional mobility as evident by her ability to ascend/descend stairs with minimal to no increase in pain. Despite these improvements, pt continues to demonstrate decreased B LE strength/endurance (L LE weaker), slight hypermobility throughout B LEs, antalgic gait, decreased sitting tolerance, decreased standing tolerance, and decreased functional mobility. Although pt states she now has minimal to no pain when doing any task where she is able to keep her L knee straight, she states if she does any activity that requires some rotation at her L knee then it hurts more again. Pt has responded very well to supportive kinesiotaping at her L knee and is working on stretching and strengthening exercises to improve her L patellofemoral alignment and reduce her reliance on the taping.   Pt may continue to benefit from skilled PT to address the above listed deficits to improve ability to perform pain-free ADLs/IADLs and to improve overall quality of life prior to discharge. PROBLEM LIST (Impacting functional limitations):  1. Decreased Strength  2. Decreased ADL/Functional Activities  3. Decreased Transfer Abilities  4. Decreased Ambulation Ability/Technique  5. Decreased Balance  6. Increased Pain  7. Decreased Activity Tolerance  8. Decreased Pacing Skills  9. Increased Fatigue  10. Edema/Girth INTERVENTIONS PLANNED: (Treatment may consist of any combination of the following)  1. Balance Exercise  2. Bed Mobility  3. Cold  4. Electrical Stimulation  5. Family Education  6. Gait Training  7. Heat  8. Home Exercise Program (HEP)  9. Manual Therapy  10. Neuromuscular Re-education/Strengthening  11. Range of Motion (ROM)  12. Therapeutic Activites  13. Therapeutic Exercise/Strengthening  14. Transcutaneous Electrical Nerve Stimulation (TENS)  15. Transfer Training  16. Ultrasound (US)  17. Vasopneumatic Compression     GOALS: (Goals have been discussed and agreed upon with patient.)  Short-Term Functional Goals: Time Frame: 4 weeks  1. Pt will be compliant with HEP in order to increase LE strength/endurance/mobility to improve functional mobility and overall quality of life. (MET, 8/16/2021)   2. Pt will improve score on the Lower Extremity Functional Scale to 35/80 in order to demonstrate improved functional mobility and quality of life. (CONTINUE, 8/16/2021)   3. Pt will report walking for > 20 minutes with minimal to no increase in pain in order to be able to walk for prolonged periods as needed to perform grocery shopping.  (MET, 8/16/2021)   4. Pt will be able to ascend/descend 6 steps with modified independence with rail with safe gait pattern with minimal to no increase in pain in order to improve community mobility. (MET, 8/16/2021)   Discharge Goals: Time Frame: 8 weeks  1.  Pt will be independent with HEP in order to increase LE strength/endurance/mobility to improve functional mobility and overall quality of life. 2. Pt will improve score on the Lower Extremity Functional Scale to 42/80 in order to demonstrate improved functional mobility and quality of life. 3. Pt will report walking for > 25 minutes with minimal to no increase in pain in order to be able to walk for prolonged periods as needed to perform grocery shopping. 4. Pt will be able to ascend/descend 12 steps with independence without rail with reciprocal gait pattern with minimal to no increase in pain in order to improve community mobility. OUTCOME MEASURE:   Tool Used: Lower Extremity Functional Scale (LEFS)  Score:  Initial: 28/80 Most Recent: TBA/80 (Date: -- )   Interpretation of Score: 20 questions each scored on a 5 point scale with 0 representing \"extreme difficulty or unable to perform\" and 4 representing \"no difficulty\". The lower the score, the greater the functional disability. 80/80 represents no disability. Minimal detectable change is 9 points. Tool Used: Timed Up and Go (TUG)  Score:  Initial: 6.9 seconds no AD Most Recent: X seconds (Date: -- )   Interpretation of Score: The test measures, in seconds, the time taken by an individual to stand up from a standard arm chair (seat height 46 cm [18 in], arm height 65 cm [25.6 in]), walk a distance of 3 meters (118 in, approx 10 ft), turn, walk back to the chair and sit down. If the individual takes longer than 14 seconds to complete TUG, this indicates risk for falls. FALL RISK:    Ambulatory/Rehab Services H2 Model Falls Risk Assessment   Risk Factors:       No Risk Factors Identified Ability to Rise from Chair:       (1)  Pushes up, successful in one attempt   Falls Prevention Plan:       No modifications necessary   Total: (5 or greater = High Risk): 1   ©2010 [employer]. All Rights Reserved. [facility] Patent #[id number].  Federal Law prohibits the replication, distribution or use without written permission from [facility] of [address] [place] [address] FINDINGS:     Reassessment on 8/16/2021   ROM:    Initial measurement: (on 7/19/2021) Initial measurement: (on 7/19/2021) Reassessment measurement:  Reassessment measurement:      WFL and non-painful throughout L hip, knee, and ankle  Pt slightly hypermobile WFL and non-painful throughout R hip, knee, and ankle  Pt slightly hypermobile       Strength:     Initial measurement: (on 7/19/2021) Initial measurement: (on 7/19/2021)  Reassessment (on 8/16/2021) Reassessment (on 8/16/2021)    L LE: R LE: L LE: R LE:   Hip flexion 4/5  4+/5  4/5 4+/5   Hip ER 4/5  4/5  4+/5 4+/5   Hip IR 4+/5  4+/5  4+/5 4+/5   Hip abduction 4/5  4/5  4/5 4/5   Hip adduction 3+/5  4/5  4/5 4-/5   Hip extension 4/5  4/5  3+/5 4/5   Knee flexion 4-/5  4/5  4-/5 4/5   Knee extension 5/5  5/5  5/5 5/5   Ankle DF  5/5  5/5  4+/5 4+/5     Special Tests:          Eckert's: + for slight pain and weakness at L at about 100 degrees flexion that improved with medial patellar glide, - at R        Olga's: - B  Functional Mobility:   Gait/Ambulation: Pt ambulates with no AD with following gait deficits: antalgic gait         Transfers:  Independent on 8/16/2021    MEDICAL NECESSITY:   · Patient is expected to demonstrate progress in strength and functional technique to improve ability to perform pain-free standing/ambulation. REASON FOR SERVICES/OTHER COMMENTS:  · Patient continues to require modification of therapeutic interventions to increase complexity of exercises. Rehabilitation Potential For Stated Goals: Good  Regarding Andrés Bailey therapy, I certify that the treatment plan above will be carried out by a therapist or under their direction. Thank you for this referral,  Lorri Nelson DPT     Referring Physician Signature: Tuan Fu MD No Signature is Required for this note.

## 2021-08-19 ENCOUNTER — HOSPITAL ENCOUNTER (OUTPATIENT)
Dept: PHYSICAL THERAPY | Age: 56
Discharge: HOME | End: 2021-08-19
Payer: COMMERCIAL

## 2021-08-19 PROCEDURE — 97110 THERAPEUTIC EXERCISES: CPT

## 2021-08-19 PROCEDURE — 97140 MANUAL THERAPY 1/> REGIONS: CPT

## 2021-08-19 NOTE — PROGRESS NOTES
Tiffanydavid Valerio  : 1965  Primary:   Secondary:  [address]  at [facility]  [address], [address], [place], [facility] [address] [facility]  [phone]   [phone]       OUTPATIENT PHYSICAL THERAPY: Daily Treatment Note 2021  Visit Count:  9       ICD-10: Treatment Diagnosis: Pain in left knee (M25.562)   Difficulty in walking, not elsewhere classified (R26.2)  Muscle weakness (generalized) (M62.81)  Precautions/Allergies:   Patient has no known allergies. TREATMENT PLAN:  Effective Dates/Frequency/Duration: Twice per week from 2021 until 2021 (8 weeks). Pre-treatment Symptoms/Complaints:  Patient states she has been feeling less pain on her L LE but is apprehensive about trying to go without the tape. Patient reports the pool exercises have been great. She also reported she is starting to use the neighborhood gym. Pain: Initial:  10 still hurts when twisted Post Session: no pain reported    Medications Last Reviewed:  2021  Updated Objective Findings: None Today   TREATMENT:   Therapeutic Exercise: ( 12 minutes):  Exercises per grid below to improve mobility, strength and dynamic movement of left knee to improve functional mobility. Required minimal visual, verbal and manual cues to promote proper body alignment, promote proper body posture, promote proper body mechanics and promote proper body breathing techniques. Progressed resistance, range, repetitions and complexity of movement as indicated.     Date:  2021 Date:  2021 Date:  2021 Date:  2021 Date:  2021 Date:  2021   Activity/Exercise         SLR in supine* X 20 B  With good quad set  No weight quickly progressing to 1.5 ankle weight; 20 reps/1 set L LE with cues for slow lowering 2 ankle weight; 20 reps/1 set L LE with cues for slow lowering; cues to avoid hyperextension at L knee      Bridging in hooklying* X 20  15 reps/1 set with B LEs on green exercise ball for increased challenge 20 reps/1 set with B LEs on green exercise ball for increased challenge and to avoid hip IR      Sidelying clamshells* X 20 B        Ankle plantarflexor stretch on incline board 3 x 30 sec hold with knees extended  3 x 30 sec hold with knees extended  3 x 30 sec hold with knees extended; 3rd notch from bottom      Nustep  Level 3  10 minutes with B LE's only to increase strength and endurance  L4 resistance for 10 minutes with B LEs only to increase strength/endurance; cues to avoid excessive hip IR at L to reduce L knee pain L5 resistance for 10 minutes with B LEs only to increase strength/endurance 10 minutes   Level 5 L5 resistance for 10 minutes with B LEs only to increase strength/endurance L5   10 minutes with no UE   Sidelying hip abduction* X 20 B        Prone hip extension*  20 reps/1 set L LE with cues for slow movement and avoiding bringing hip up off of bed       TKE Yellow   X 10 L        LAQ in sitting  2 lb ankle weight; 20 reps/1 set L LE with cues to avoid hyperextension; cues for slow lowering  SAQ x 15  With red band - pushing out - x 10    with red ball - squeezing in - x 10      Reverse clamshells in sidelying*  20 reps/1 set L LE with cues for slow lowering       Leg press machine  15 lb resistance; 25 reps/1 set with cues to avoid hip IR throughout   15 lb resistance; 20 reps/2 sets with cues to avoid hip IR throughout    Hamstring stretch in supine*   30 second hold x 3 reps/1 set each LE; pt stretching with strap      Jose test stretch for hip flexors/quads*   30 second hold x 1 rep/1 set each LE; pt providing passive overpressure with strap      Hip abductor stretch in supine*   30 second hold x 3 reps/1 set; pt stretching with strap      Varying resistance at each LE in multiple different planes of motion     Per strength assessment in chart             IT band stretch       With strap             *given in HEP  [employer] Portal   Pt given following band colors: [] Yellow [] Red          [] Green          [] Blue          [] Grey     Manual therapy (35 minutes): With pt in supported supine position:    - STM to L quad and IT band to decrease tightness, pain, but increase mobility. - applied kinesiotape to anterior L knee with medial patellar glide in horseshoe position as well as horizontal piece of tape across middle of knee (50% stretch) to reduce pain and facilitate improved alignment of patella    Treatment/Session Summary:    Response to Treatment:  Spoke at depth with patient regarding possibly going to once a week since she is starting to work out at [employer] and still in pool. Worked on IT band to decrease tightness in order to have good patellar alignment. No adverse reactions/unusual changes observed/reported in clinical status this session. Communication/Consultation:  None today  Equipment provided today:  None today  Recommendations/Intent for next treatment session: Next visit will focus on manual therapy/modalities to reduce pain; kinesiotaping for medial patellar glide; strengthening/stretching surrounding L knee.     Total Treatment Billable Duration:  47 minutes  PT Patient Time In/Time Out  Time In: 1300  Time Out: [address] [facility], [facility]    Future Appointments   Date Time Provider Gabrielle Malone   8/23/2021 10:15 AM Carroll Souza [facility] [place]   8/26/2021  1:00 PM Carroll Souza [facility] BASIL[facility]   8/30/2021  1:45 PM Symone An MD SSA POAI POA   8/31/2021  1:00 PM Carroll Souza [facility] [place]   9/2/2021  1:00 PM Carroll Souza [facility] SFGREGORIA        Visit Approval Visit # Therapist initials Date A NS / Cx < 24 hr >24 hr Cx Comments   30 visits total 1  7/19/2021 [x]  [] [] Initial evaluation    2  7/23/2021 [x] [] []     3 PDE 7- [x] [] []     4 PDE 7- [x] [] []     5  8/2/2021 [x] [] []     6  8/5/2021 [x] [] []     7 PDE 8-9-2021 [x] [] []       8/12/2021 [] [] [x]     8  8/16/2021 [x] [] [] Progress note    9 PDE 8- [x] [] [] 1       [] [] []        [] [] []        [] [] []        [] [] []        [] [] []        [] [] []        [] [] []        [] [] []     /

## 2021-08-26 ENCOUNTER — HOSPITAL ENCOUNTER (OUTPATIENT)
Dept: PHYSICAL THERAPY | Age: 56
Discharge: HOME | End: 2021-08-26
Payer: COMMERCIAL

## 2021-08-26 PROCEDURE — 97140 MANUAL THERAPY 1/> REGIONS: CPT

## 2021-08-26 PROCEDURE — 97110 THERAPEUTIC EXERCISES: CPT

## 2021-08-26 NOTE — PROGRESS NOTES
Stephanie Chavez  : 1965  Primary:   Secondary:  [address], [address], [place], [address] [facility]  [phone]   [phone]       OUTPATIENT PHYSICAL THERAPY: Daily Treatment Note 2021  Visit Count:  10       ICD-10: Treatment Diagnosis: Pain in left knee (M25.562)   Difficulty in walking, not elsewhere classified (R26.2)  Muscle weakness (generalized) (M62.81)  Precautions/Allergies:   Patient has no known allergies. TREATMENT PLAN:  Effective Dates/Frequency/Duration: Twice per week from 2021 until 2021 (8 weeks). Pre-treatment Symptoms/Complaints:  Patient states she has not been taped for several days and has not had a significant increase in her pain (unless if she twists her knee)  Pain: Initial:  2/10 still hurts when twisted Post Session: no pain reported    Medications Last Reviewed:  2021  Updated Objective Findings: None Today   TREATMENT:   Therapeutic Exercise: ( 12 minutes):  Exercises per grid below to improve mobility, strength and dynamic movement of left knee to improve functional mobility. Required minimal visual, verbal and manual cues to promote proper body alignment, promote proper body posture, promote proper body mechanics and promote proper body breathing techniques. Progressed resistance, range, repetitions and complexity of movement as indicated.     Date:  2021 Date:  2021 Date:  2021 Date:  2021 Date:  2021 Date:  2021   Activity/Exercise         SLR in supine* No weight quickly progressing to 1.5 ankle weight; 20 reps/1 set L LE with cues for slow lowering 2 ankle weight; 20 reps/1 set L LE with cues for slow lowering; cues to avoid hyperextension at L knee       Bridging in hooklying* 15 reps/1 set with B LEs on green exercise ball for increased challenge 20 reps/1 set with B LEs on green exercise ball for increased challenge and to avoid hip IR       Sidelying clamshells*         Ankle plantarflexor stretch on incline board 3 x 30 sec hold with knees extended  3 x 30 sec hold with knees extended; 3rd notch from bottom    3 x 30 sec hold with knees extended; 3rd notch from bottom   Nustep  L4 resistance for 10 minutes with B LEs only to increase strength/endurance; cues to avoid excessive hip IR at L to reduce L knee pain L5 resistance for 10 minutes with B LEs only to increase strength/endurance 10 minutes   Level 5 L5 resistance for 10 minutes with B LEs only to increase strength/endurance L5   10 minutes with no UE L4 resistance for 10 minutes with B LEs only to increase strength/endurance   Sidelying hip abduction*         Prone hip extension* 20 reps/1 set L LE with cues for slow movement and avoiding bringing hip up off of bed        TKE         LAQ in sitting 2 lb ankle weight; 20 reps/1 set L LE with cues to avoid hyperextension; cues for slow lowering  SAQ x 15  With red band - pushing out - x 10    with red ball - squeezing in - x 10       Reverse clamshells in sidelying* 20 reps/1 set L LE with cues for slow lowering        Leg press machine 15 lb resistance; 25 reps/1 set with cues to avoid hip IR throughout   15 lb resistance; 20 reps/2 sets with cues to avoid hip IR throughout     Hamstring stretch in supine*  30 second hold x 3 reps/1 set each LE; pt stretching with strap       Jose test stretch for hip flexors/quads*  30 second hold x 1 rep/1 set each LE; pt providing passive overpressure with strap       Hip abductor stretch in supine*  30 second hold x 3 reps/1 set; pt stretching with strap       Varying resistance at each LE in multiple different planes of motion    Per strength assessment in chart              IT band stretch      With strap              *given in HEP  [employer] Portal   Pt given following band colors: [] Yellow          [] Red          [] Green          [] Blue          [] Grey     Manual therapy (28 minutes):   With pt in supported supine position:    - STM and cross friction massage with hands and with silicone cupping along L ITB, quad, and connective tissue surrounding L lateral thigh to break up adhesions, reduce muscular tightness and pain   - guided pt in applying kinesiotape to anterior L knee with medial patellar glide in horseshoe position as well as horizontal piece of tape across middle of knee (50% stretch) to reduce pain and facilitate improved alignment of patella    Treatment/Session Summary:    Response to Treatment:  Pt concerned about whether or not her patella will stay properly aligned if she is not wearing the tape - therapist reassured pt that as long as she does not have significant increase in pain her patella is staying sufficiently aligned. Educated pt on benefits of silicone cupping for reducing adhesions and tightness in L ITB for improved alignment at patella - pt verbalizing understanding. Pt able to tape her knee with minimal guidance, and demonstrated good understanding of use of anchors - may require more practice for achieving increased stretch with tape for more support. No adverse reactions/unusual changes observed/reported in clinical status this session. Communication/Consultation:  None today  Equipment provided today:  None today  Recommendations/Intent for next treatment session: Next visit will focus on manual therapy/modalities to reduce pain; kinesiotaping for medial patellar glide; strengthening/stretching surrounding L knee.     Total Treatment Billable Duration:  40 minutes  PT Patient Time In/Time Out  Time In: 6663  Time Out: 20 [facility]    Future Appointments   Date Time Provider Gabrielle Malone   8/30/2021  1:45 PM MD SANDIP Ugarte   8/31/2021  1:00 PM Pat Sorensen DPT [facility]   9/2/2021  1:00 PM Pat Sorensen DPT [facility]        Visit Approval Visit # Therapist initials Date A NS / Cx < 24 hr >24 hr Cx Comments   30 visits total 1  7/19/2021 [x]  [] [] Initial evaluation    2 [facility] 7/23/2021 [x] [] []     3 PDE 7- [x] [] []     4 PDE 7- [x] [] []     5 [facility] 8/2/2021 [x] [] []     6 [facility] 8/5/2021 [x] [] []     7 PDE 8-9-2021 [x] [] []      [facility] 8/12/2021 [] [] [x]     8 [facility] 8/16/2021 [x] [] [] Progress note    9 PDE 8- [x] [] [] 1     [facility] 8/23/2021 [] [x] []     10 [facility] 8/26/2021 [x] [] []        [] [] []        [] [] []        [] [] []        [] [] []        [] [] []        [] [] []

## 2021-08-27 ENCOUNTER — APPOINTMENT (RX ONLY)
Dept: URBAN - METROPOLITAN AREA CLINIC 349 | Facility: CLINIC | Age: 56
Setting detail: DERMATOLOGY
End: 2021-08-27

## 2021-08-27 DIAGNOSIS — L70.0 ACNE VULGARIS: ICD-10-CM | Status: INADEQUATELY CONTROLLED

## 2021-08-27 DIAGNOSIS — L20.89 OTHER ATOPIC DERMATITIS: ICD-10-CM

## 2021-08-27 PROBLEM — L20.84 INTRINSIC (ALLERGIC) ECZEMA: Status: ACTIVE | Noted: 2021-08-27

## 2021-08-27 PROCEDURE — ? OTHER

## 2021-08-27 PROCEDURE — ? PRESCRIPTION MEDICATION MANAGEMENT

## 2021-08-27 PROCEDURE — ? COUNSELING

## 2021-08-27 PROCEDURE — 99204 OFFICE O/P NEW MOD 45 MIN: CPT

## 2021-08-27 ASSESSMENT — LOCATION DETAILED DESCRIPTION DERM
LOCATION DETAILED: RIGHT INFERIOR CENTRAL MALAR CHEEK
LOCATION DETAILED: RIGHT CHIN
LOCATION DETAILED: LEFT INFERIOR CENTRAL MALAR CHEEK
LOCATION DETAILED: LEFT VENTRAL DISTAL FOREARM

## 2021-08-27 ASSESSMENT — LOCATION SIMPLE DESCRIPTION DERM
LOCATION SIMPLE: RIGHT CHEEK
LOCATION SIMPLE: LEFT FOREARM
LOCATION SIMPLE: LEFT CHEEK
LOCATION SIMPLE: CHIN

## 2021-08-27 ASSESSMENT — LOCATION ZONE DERM
LOCATION ZONE: FACE
LOCATION ZONE: ARM

## 2021-08-27 NOTE — HPI: PIMPLES (ACNE)
How Severe Is Your Acne?: moderate
Is This A New Presentation, Or A Follow-Up?: Acne
Additional Comments (Use Complete Sentences): Patient is here for acne on her face. She states she has been dealing with it for many years and has been on numerous prescription medications. Patient states she is currently on hormone replacement therapy for menopausal symptoms. Patient states she does not have a menstrual cycle.

## 2021-08-27 NOTE — PROCEDURE: COUNSELING
Topical Sulfur Applications Pregnancy And Lactation Text: This medication is Pregnancy Category C and has an unknown safety profile during pregnancy. It is unknown if this topical medication is excreted in breast milk.
Include Pregnancy/Lactation Warning?: No
Azithromycin Pregnancy And Lactation Text: This medication is considered safe during pregnancy and is also secreted in breast milk.
Topical Clindamycin Counseling: Patient counseled that this medication may cause skin irritation or allergic reactions.  In the event of skin irritation, the patient was advised to reduce the amount of the drug applied or use it less frequently.   The patient verbalized understanding of the proper use and possible adverse effects of clindamycin.  All of the patient's questions and concerns were addressed.
Tazorac Counseling:  Patient advised that medication is irritating and drying.  Patient may need to apply sparingly and wash off after an hour before eventually leaving it on overnight.  The patient verbalized understanding of the proper use and possible adverse effects of tazorac.  All of the patient's questions and concerns were addressed.
Doxycycline Pregnancy And Lactation Text: This medication is Pregnancy Category D and not consider safe during pregnancy. It is also excreted in breast milk but is considered safe for shorter treatment courses.
Sarecycline Counseling: Patient advised regarding possible photosensitivity and discoloration of the teeth, skin, lips, tongue and gums.  Patient instructed to avoid sunlight, if possible.  When exposed to sunlight, patients should wear protective clothing, sunglasses, and sunscreen.  The patient was instructed to call the office immediately if the following severe adverse effects occur:  hearing changes, easy bruising/bleeding, severe headache, or vision changes.  The patient verbalized understanding of the proper use and possible adverse effects of sarecycline.  All of the patient's questions and concerns were addressed.
Detail Level: Zone
Spironolactone Counseling: Patient advised regarding risks of diarrhea, abdominal pain, hyperkalemia, birth defects (for female patients), liver toxicity and renal toxicity. The patient may need blood work to monitor liver and kidney function and potassium levels while on therapy. The patient verbalized understanding of the proper use and possible adverse effects of spironolactone.  All of the patient's questions and concerns were addressed.
Spironolactone Pregnancy And Lactation Text: This medication can cause feminization of the male fetus and should be avoided during pregnancy. The active metabolite is also found in breast milk.
Topical Sulfur Applications Counseling: Topical Sulfur Counseling: Patient counseled that this medication may cause skin irritation or allergic reactions.  In the event of skin irritation, the patient was advised to reduce the amount of the drug applied or use it less frequently.   The patient verbalized understanding of the proper use and possible adverse effects of topical sulfur application.  All of the patient's questions and concerns were addressed.
Minocycline Counseling: Patient advised regarding possible photosensitivity and discoloration of the teeth, skin, lips, tongue and gums.  Patient instructed to avoid sunlight, if possible.  When exposed to sunlight, patients should wear protective clothing, sunglasses, and sunscreen.  The patient was instructed to call the office immediately if the following severe adverse effects occur:  hearing changes, easy bruising/bleeding, severe headache, or vision changes.  The patient verbalized understanding of the proper use and possible adverse effects of minocycline.  All of the patient's questions and concerns were addressed.
Topical Retinoid Pregnancy And Lactation Text: This medication is Pregnancy Category C. It is unknown if this medication is excreted in breast milk.
Birth Control Pills Counseling: Birth Control Pill Counseling: I discussed with the patient the potential side effects of OCPs including but not limited to increased risk of stroke, heart attack, thrombophlebitis, deep venous thrombosis, hepatic adenomas, breast changes, GI upset, headaches, and depression.  The patient verbalized understanding of the proper use and possible adverse effects of OCPs. All of the patient's questions and concerns were addressed.
Tazorac Pregnancy And Lactation Text: This medication is not safe during pregnancy. It is unknown if this medication is excreted in breast milk.
Bactrim Pregnancy And Lactation Text: This medication is Pregnancy Category D and is known to cause fetal risk.  It is also excreted in breast milk.
High Dose Vitamin A Counseling: Side effects reviewed, pt to contact office should one occur.
High Dose Vitamin A Pregnancy And Lactation Text: High dose vitamin A therapy is contraindicated during pregnancy and breast feeding.
Topical Clindamycin Pregnancy And Lactation Text: This medication is Pregnancy Category B and is considered safe during pregnancy. It is unknown if it is excreted in breast milk.
Erythromycin Counseling:  I discussed with the patient the risks of erythromycin including but not limited to GI upset, allergic reaction, drug rash, diarrhea, increase in liver enzymes, and yeast infections.
Isotretinoin Counseling: Patient should get monthly blood tests, not donate blood, not drive at night if vision affected, not share medication, and not undergo elective surgery for 6 months after tx completed. Side effects reviewed, pt to contact office should one occur.
Minocycline Pregnancy And Lactation Text: This medication is Pregnancy Category D and not consider safe during pregnancy. It is also excreted in breast milk.
Azithromycin Counseling:  I discussed with the patient the risks of azithromycin including but not limited to GI upset, allergic reaction, drug rash, diarrhea, and yeast infections.
Benzoyl Peroxide Counseling: Patient counseled that medicine may cause skin irritation and bleach clothing.  In the event of skin irritation, the patient was advised to reduce the amount of the drug applied or use it less frequently.   The patient verbalized understanding of the proper use and possible adverse effects of benzoyl peroxide.  All of the patient's questions and concerns were addressed.
Isotretinoin Pregnancy And Lactation Text: This medication is Pregnancy Category X and is considered extremely dangerous during pregnancy. It is unknown if it is excreted in breast milk.
Benzoyl Peroxide Pregnancy And Lactation Text: This medication is Pregnancy Category C. It is unknown if benzoyl peroxide is excreted in breast milk.
Topical Retinoid counseling:  Patient advised to apply a pea-sized amount only at bedtime and wait 30 minutes after washing their face before applying.  If too drying, patient may add a non-comedogenic moisturizer. The patient verbalized understanding of the proper use and possible adverse effects of retinoids.  All of the patient's questions and concerns were addressed.
Birth Control Pills Pregnancy And Lactation Text: This medication should be avoided if pregnant and for the first 30 days post-partum.
Erythromycin Pregnancy And Lactation Text: This medication is Pregnancy Category B and is considered safe during pregnancy. It is also excreted in breast milk.
Bactrim Counseling:  I discussed with the patient the risks of sulfa antibiotics including but not limited to GI upset, allergic reaction, drug rash, diarrhea, dizziness, photosensitivity, and yeast infections.  Rarely, more serious reactions can occur including but not limited to aplastic anemia, agranulocytosis, methemoglobinemia, blood dyscrasias, liver or kidney failure, lung infiltrates or desquamative/blistering drug rashes.
Tetracycline Counseling: Patient counseled regarding possible photosensitivity and increased risk for sunburn.  Patient instructed to avoid sunlight, if possible.  When exposed to sunlight, patients should wear protective clothing, sunglasses, and sunscreen.  The patient was instructed to call the office immediately if the following severe adverse effects occur:  hearing changes, easy bruising/bleeding, severe headache, or vision changes.  The patient verbalized understanding of the proper use and possible adverse effects of tetracycline.  All of the patient's questions and concerns were addressed. Patient understands to avoid pregnancy while on therapy due to potential birth defects.
Dapsone Counseling: I discussed with the patient the risks of dapsone including but not limited to hemolytic anemia, agranulocytosis, rashes, methemoglobinemia, kidney failure, peripheral neuropathy, headaches, GI upset, and liver toxicity.  Patients who start dapsone require monitoring including baseline LFTs and weekly CBCs for the first month, then every month thereafter.  The patient verbalized understanding of the proper use and possible adverse effects of dapsone.  All of the patient's questions and concerns were addressed.
Dapsone Pregnancy And Lactation Text: This medication is Pregnancy Category C and is not considered safe during pregnancy or breast feeding.
Doxycycline Counseling:  Patient counseled regarding possible photosensitivity and increased risk for sunburn.  Patient instructed to avoid sunlight, if possible.  When exposed to sunlight, patients should wear protective clothing, sunglasses, and sunscreen.  The patient was instructed to call the office immediately if the following severe adverse effects occur:  hearing changes, easy bruising/bleeding, severe headache, or vision changes.  The patient verbalized understanding of the proper use and possible adverse effects of doxycycline.  All of the patient's questions and concerns were addressed.

## 2021-08-27 NOTE — PROCEDURE: PRESCRIPTION MEDICATION MANAGEMENT
Detail Level: Zone
Render In Strict Bullet Format?: No
Initiate Treatment: Spironolactone 50mg daily (if pcp is okay with this)

## 2021-08-27 NOTE — PROCEDURE: OTHER
Other (Free Text): Declines treatment today
Note Text (......Xxx Chief Complaint.): This diagnosis correlates with the
Detail Level: Zone
Render Risk Assessment In Note?: yes
Other (Free Text): Patient is here for acne on her face. She states she has been dealing with it for many years and has been on numerous prescription medications. Patient states she is currently on hormone replacement therapy for menopausal symptoms. \\n\\nPatient states she does not have a menstrual cycle. \\n\\nDiscussed accutane and Spironolactone with patient. Advised patient that she would have to be off hormone replacement therapy to be on Spironolactone. If she were on both it could result in adverse side effects that she is trying to avoid. Also would likely need to monitor blood work. Patient denies taking blood pressure medications.\\n\\nPatient states both her daughters have taken accutane and her youngest daughter is still experiencing breakouts.\\n\\nPatient will talk to her primary care doctor about the Spironolactone and she will let us know. We will prescribe 50mg once daily.

## 2021-08-31 ENCOUNTER — APPOINTMENT (OUTPATIENT)
Dept: PHYSICAL THERAPY | Age: 56
End: 2021-08-31
Payer: COMMERCIAL

## 2021-09-02 ENCOUNTER — HOSPITAL ENCOUNTER (OUTPATIENT)
Dept: PHYSICAL THERAPY | Age: 56
Discharge: HOME | End: 2021-09-02

## 2021-09-20 PROBLEM — M17.12 PRIMARY OSTEOARTHRITIS OF LEFT KNEE: Status: ACTIVE | Noted: 2021-09-20

## 2021-10-11 NOTE — THERAPY DISCHARGE
Amber Mosher  : 1965  Primary:   Secondary:  [facility] [facility], [address]  [phone]   [phone]        OUTPATIENT PHYSICAL THERAPY:Discontinuation Summary 2021   ICD-10: Treatment Diagnosis: Pain in left knee (M25.562)   Difficulty in walking, not elsewhere classified (R26.2)  Muscle weakness (generalized) (M62.81)  Precautions/Allergies:   Patient has no known allergies. TREATMENT PLAN:  Effective Dates/Frequency/Duration: Twice per week from 2021 until 2021 (8 weeks). MEDICAL/REFERRING DIAGNOSIS:  Pain in left knee [M25.562]  Patellofemoral disorders, left knee [M22.2X2]   DATE OF ONSET: 2021  REFERRING PHYSICIAN: Charlie Pollock MD MD Orders: Evaluate and treat  Return MD Appointment: unspecified   Amber Mosher has been seen in physical therapy from 2021 to 2021 for 10 visits. Treatment has been discontinued at this time due to pt requesting to discharge. The below goals were met prior to discontinuation. Some goals were not met due to pt not attending additional sessions. Thank you for this referral.     PROBLEM LIST (Impacting functional limitations):  1. Decreased Strength  2. Decreased ADL/Functional Activities  3. Decreased Transfer Abilities  4. Decreased Ambulation Ability/Technique  5. Decreased Balance  6. Increased Pain  7. Decreased Activity Tolerance  8. Decreased Pacing Skills  9. Increased Fatigue  10. Edema/Girth INTERVENTIONS PLANNED: (Treatment may consist of any combination of the following)  1. Balance Exercise  2. Bed Mobility  3. Cold  4. Electrical Stimulation  5. Family Education  6. Gait Training  7. Heat  8. Home Exercise Program (HEP)  9. Manual Therapy  10. Neuromuscular Re-education/Strengthening  11. Range of Motion (ROM)  12. Therapeutic Activites  13. Therapeutic Exercise/Strengthening  14.  Transcutaneous Electrical Nerve Stimulation (TENS)  15. Transfer Training  16. Ultrasound (US)  17. Vasopneumatic Compression     GOALS: (Goals have been discussed and agreed upon with patient.)  Short-Term Functional Goals: Time Frame: 4 weeks  1. Pt will be compliant with HEP in order to increase LE strength/endurance/mobility to improve functional mobility and overall quality of life. (MET, 8/16/2021)   2. Pt will improve score on the Lower Extremity Functional Scale to 35/80 in order to demonstrate improved functional mobility and quality of life. (CONTINUE, 8/26/2021)   3. Pt will report walking for > 20 minutes with minimal to no increase in pain in order to be able to walk for prolonged periods as needed to perform grocery shopping.  (MET, 8/16/2021)   4. Pt will be able to ascend/descend 6 steps with modified independence with rail with safe gait pattern with minimal to no increase in pain in order to improve community mobility. (MET, 8/16/2021)   Discharge Goals: Time Frame: 8 weeks  1. Pt will be independent with HEP in order to increase LE strength/endurance/mobility to improve functional mobility and overall quality of life. 2. Pt will improve score on the Lower Extremity Functional Scale to 42/80 in order to demonstrate improved functional mobility and quality of life. 3. Pt will report walking for > 25 minutes with minimal to no increase in pain in order to be able to walk for prolonged periods as needed to perform grocery shopping. 4. Pt will be able to ascend/descend 12 steps with independence without rail with reciprocal gait pattern with minimal to no increase in pain in order to improve community mobility. OUTCOME MEASURE:   Tool Used: Lower Extremity Functional Scale (LEFS)  Score:  Initial: 28/80 Most Recent: TBA/80 (Date: -- )   Interpretation of Score: 20 questions each scored on a 5 point scale with 0 representing \"extreme difficulty or unable to perform\" and 4 representing \"no difficulty\".   The lower the score, the greater the functional disability. 80/80 represents no disability. Minimal detectable change is 9 points. Tool Used: Timed Up and Go (TUG)  Score:  Initial: 6.9 seconds no AD Most Recent: X seconds (Date: -- )   Interpretation of Score: The test measures, in seconds, the time taken by an individual to stand up from a standard arm chair (seat height 46 cm [18 in], arm height 65 cm [25.6 in]), walk a distance of 3 meters (118 in, approx 10 ft), turn, walk back to the chair and sit down. If the individual takes longer than 14 seconds to complete TUG, this indicates risk for falls. FALL RISK:    Ambulatory/Rehab Services H2 Model Falls Risk Assessment   Risk Factors:       No Risk Factors Identified Ability to Rise from Chair:       (1)  Pushes up, successful in one attempt   Falls Prevention Plan:       No modifications necessary   Total: (5 or greater = High Risk): 1   ©2010 [facility] of [employer]. All Rights Reserved. [facility] States Patent #[id number]. Federal Law prohibits the replication, distribution or use without written permission from [facility] of [employer] you for this referral,  Brooke Abdul DPT     Referring Physician Signature: Lolis Goodwin MD No Signature is Required for this note.

## 2022-08-18 ENCOUNTER — APPOINTMENT (RX ONLY)
Dept: URBAN - METROPOLITAN AREA CLINIC 329 | Facility: CLINIC | Age: 57
Setting detail: DERMATOLOGY
End: 2022-08-18

## 2022-08-18 DIAGNOSIS — L70.0 ACNE VULGARIS: ICD-10-CM | Status: INADEQUATELY CONTROLLED

## 2022-08-18 PROBLEM — D23.71 OTHER BENIGN NEOPLASM OF SKIN OF RIGHT LOWER LIMB, INCLUDING HIP: Status: ACTIVE | Noted: 2022-08-18

## 2022-08-18 PROCEDURE — ? COUNSELING

## 2022-08-18 PROCEDURE — ? PRESCRIPTION MEDICATION MANAGEMENT

## 2022-08-18 PROCEDURE — ? FULL BODY SKIN EXAM - DECLINED

## 2022-08-18 PROCEDURE — ? PRESCRIPTION

## 2022-08-18 PROCEDURE — ? ADDITIONAL NOTES

## 2022-08-18 PROCEDURE — 99214 OFFICE O/P EST MOD 30 MIN: CPT

## 2022-08-18 PROCEDURE — ? OTHER

## 2022-08-18 RX ORDER — TRIFAROTENE 50 UG/G
CREAM TOPICAL
Qty: 45 | Refills: 3 | Status: ERX | COMMUNITY
Start: 2022-08-18

## 2022-08-18 RX ADMIN — TRIFAROTENE: 50 CREAM TOPICAL at 00:00

## 2022-08-18 ASSESSMENT — LOCATION DETAILED DESCRIPTION DERM
LOCATION DETAILED: RIGHT CHIN
LOCATION DETAILED: RIGHT INFERIOR CENTRAL MALAR CHEEK
LOCATION DETAILED: LEFT INFERIOR CENTRAL MALAR CHEEK

## 2022-08-18 ASSESSMENT — LOCATION SIMPLE DESCRIPTION DERM
LOCATION SIMPLE: LEFT CHEEK
LOCATION SIMPLE: RIGHT CHEEK
LOCATION SIMPLE: CHIN

## 2022-08-18 ASSESSMENT — LOCATION ZONE DERM: LOCATION ZONE: FACE

## 2022-08-18 NOTE — PROCEDURE: OTHER
Note Text (......Xxx Chief Complaint.): This diagnosis correlates with the
Render Risk Assessment In Note?: yes
Other (Free Text): Patient stated that she was only the spironolactone for about a week and discontinued because her pcp told her she would of seen improvement by now. Patient stated she had tried several topicals and antibiotics over the past 15 years and nothing has really helped. \\n\\nPatient stated that her potassium had ran high when she was on spironolactone. \\n\\nClaire had discussed accutane with patient at her last visit. Patient stated she was hesitant just because of everything involved with accutane and the IPLEDGE system. Patient stated she is post-menopausal. \\n\\nNancy stated that she feels like she needs a topical retinoid to help with cell turnover and to try to help smooth out the texture of her skin. Mentioned that it will take over six months to really see a noticeable difference. Patient stated she has had some mild scarring from her acne in the past. Rekha had mentioned to patient that the retinoids aren’t going to help with the scarring that is present. Discussed microneedling with Mindi if she wants to be more aggressive with treatment.
Detail Level: Zone
Other (Free Text): Reassured patient that lesion is benign. Mentioned that we could remove lesion but there is a chance that the lesion will come back or could come back larger. Patient expressed understanding.
Render Risk Assessment In Note?: no

## 2022-08-18 NOTE — PROCEDURE: PRESCRIPTION MEDICATION MANAGEMENT
Detail Level: Zone
Render In Strict Bullet Format?: No
Initiate Treatment: Aklief pea sized amount to entire face once nightly
Discontinue Regimen: Spironolactone (patient stopped per pcp recommendations)

## 2022-08-18 NOTE — PROCEDURE: COUNSELING
Topical Sulfur Applications Pregnancy And Lactation Text: This medication is Pregnancy Category C and has an unknown safety profile during pregnancy. It is unknown if this topical medication is excreted in breast milk.
Include Pregnancy/Lactation Warning?: No
Azithromycin Pregnancy And Lactation Text: This medication is considered safe during pregnancy and is also secreted in breast milk.
Topical Clindamycin Counseling: Patient counseled that this medication may cause skin irritation or allergic reactions.  In the event of skin irritation, the patient was advised to reduce the amount of the drug applied or use it less frequently.   The patient verbalized understanding of the proper use and possible adverse effects of clindamycin.  All of the patient's questions and concerns were addressed.
Tazorac Counseling:  Patient advised that medication is irritating and drying.  Patient may need to apply sparingly and wash off after an hour before eventually leaving it on overnight.  The patient verbalized understanding of the proper use and possible adverse effects of tazorac.  All of the patient's questions and concerns were addressed.
Doxycycline Pregnancy And Lactation Text: This medication is Pregnancy Category D and not consider safe during pregnancy. It is also excreted in breast milk but is considered safe for shorter treatment courses.
Sarecycline Counseling: Patient advised regarding possible photosensitivity and discoloration of the teeth, skin, lips, tongue and gums.  Patient instructed to avoid sunlight, if possible.  When exposed to sunlight, patients should wear protective clothing, sunglasses, and sunscreen.  The patient was instructed to call the office immediately if the following severe adverse effects occur:  hearing changes, easy bruising/bleeding, severe headache, or vision changes.  The patient verbalized understanding of the proper use and possible adverse effects of sarecycline.  All of the patient's questions and concerns were addressed.
Spironolactone Counseling: Patient advised regarding risks of diarrhea, abdominal pain, hyperkalemia, birth defects (for female patients), liver toxicity and renal toxicity. The patient may need blood work to monitor liver and kidney function and potassium levels while on therapy. The patient verbalized understanding of the proper use and possible adverse effects of spironolactone.  All of the patient's questions and concerns were addressed.
Spironolactone Pregnancy And Lactation Text: This medication can cause feminization of the male fetus and should be avoided during pregnancy. The active metabolite is also found in breast milk.
Topical Sulfur Applications Counseling: Topical Sulfur Counseling: Patient counseled that this medication may cause skin irritation or allergic reactions.  In the event of skin irritation, the patient was advised to reduce the amount of the drug applied or use it less frequently.   The patient verbalized understanding of the proper use and possible adverse effects of topical sulfur application.  All of the patient's questions and concerns were addressed.
Minocycline Counseling: Patient advised regarding possible photosensitivity and discoloration of the teeth, skin, lips, tongue and gums.  Patient instructed to avoid sunlight, if possible.  When exposed to sunlight, patients should wear protective clothing, sunglasses, and sunscreen.  The patient was instructed to call the office immediately if the following severe adverse effects occur:  hearing changes, easy bruising/bleeding, severe headache, or vision changes.  The patient verbalized understanding of the proper use and possible adverse effects of minocycline.  All of the patient's questions and concerns were addressed.
Topical Retinoid Pregnancy And Lactation Text: This medication is Pregnancy Category C. It is unknown if this medication is excreted in breast milk.
Birth Control Pills Counseling: Birth Control Pill Counseling: I discussed with the patient the potential side effects of OCPs including but not limited to increased risk of stroke, heart attack, thrombophlebitis, deep venous thrombosis, hepatic adenomas, breast changes, GI upset, headaches, and depression.  The patient verbalized understanding of the proper use and possible adverse effects of OCPs. All of the patient's questions and concerns were addressed.
Tazorac Pregnancy And Lactation Text: This medication is not safe during pregnancy. It is unknown if this medication is excreted in breast milk.
Bactrim Pregnancy And Lactation Text: This medication is Pregnancy Category D and is known to cause fetal risk.  It is also excreted in breast milk.
High Dose Vitamin A Counseling: Side effects reviewed, pt to contact office should one occur.
High Dose Vitamin A Pregnancy And Lactation Text: High dose vitamin A therapy is contraindicated during pregnancy and breast feeding.
Topical Clindamycin Pregnancy And Lactation Text: This medication is Pregnancy Category B and is considered safe during pregnancy. It is unknown if it is excreted in breast milk.
Erythromycin Counseling:  I discussed with the patient the risks of erythromycin including but not limited to GI upset, allergic reaction, drug rash, diarrhea, increase in liver enzymes, and yeast infections.
Isotretinoin Counseling: Patient should get monthly blood tests, not donate blood, not drive at night if vision affected, not share medication, and not undergo elective surgery for 6 months after tx completed. Side effects reviewed, pt to contact office should one occur.
Minocycline Pregnancy And Lactation Text: This medication is Pregnancy Category D and not consider safe during pregnancy. It is also excreted in breast milk.
Azithromycin Counseling:  I discussed with the patient the risks of azithromycin including but not limited to GI upset, allergic reaction, drug rash, diarrhea, and yeast infections.
Benzoyl Peroxide Counseling: Patient counseled that medicine may cause skin irritation and bleach clothing.  In the event of skin irritation, the patient was advised to reduce the amount of the drug applied or use it less frequently.   The patient verbalized understanding of the proper use and possible adverse effects of benzoyl peroxide.  All of the patient's questions and concerns were addressed.
Isotretinoin Pregnancy And Lactation Text: This medication is Pregnancy Category X and is considered extremely dangerous during pregnancy. It is unknown if it is excreted in breast milk.
Benzoyl Peroxide Pregnancy And Lactation Text: This medication is Pregnancy Category C. It is unknown if benzoyl peroxide is excreted in breast milk.
Topical Retinoid counseling:  Patient advised to apply a pea-sized amount only at bedtime and wait 30 minutes after washing their face before applying.  If too drying, patient may add a non-comedogenic moisturizer. The patient verbalized understanding of the proper use and possible adverse effects of retinoids.  All of the patient's questions and concerns were addressed.
Detail Level: Zone
Birth Control Pills Pregnancy And Lactation Text: This medication should be avoided if pregnant and for the first 30 days post-partum.
Erythromycin Pregnancy And Lactation Text: This medication is Pregnancy Category B and is considered safe during pregnancy. It is also excreted in breast milk.
Bactrim Counseling:  I discussed with the patient the risks of sulfa antibiotics including but not limited to GI upset, allergic reaction, drug rash, diarrhea, dizziness, photosensitivity, and yeast infections.  Rarely, more serious reactions can occur including but not limited to aplastic anemia, agranulocytosis, methemoglobinemia, blood dyscrasias, liver or kidney failure, lung infiltrates or desquamative/blistering drug rashes.
Tetracycline Counseling: Patient counseled regarding possible photosensitivity and increased risk for sunburn.  Patient instructed to avoid sunlight, if possible.  When exposed to sunlight, patients should wear protective clothing, sunglasses, and sunscreen.  The patient was instructed to call the office immediately if the following severe adverse effects occur:  hearing changes, easy bruising/bleeding, severe headache, or vision changes.  The patient verbalized understanding of the proper use and possible adverse effects of tetracycline.  All of the patient's questions and concerns were addressed. Patient understands to avoid pregnancy while on therapy due to potential birth defects.
Dapsone Counseling: I discussed with the patient the risks of dapsone including but not limited to hemolytic anemia, agranulocytosis, rashes, methemoglobinemia, kidney failure, peripheral neuropathy, headaches, GI upset, and liver toxicity.  Patients who start dapsone require monitoring including baseline LFTs and weekly CBCs for the first month, then every month thereafter.  The patient verbalized understanding of the proper use and possible adverse effects of dapsone.  All of the patient's questions and concerns were addressed.
Dapsone Pregnancy And Lactation Text: This medication is Pregnancy Category C and is not considered safe during pregnancy or breast feeding.
Doxycycline Counseling:  Patient counseled regarding possible photosensitivity and increased risk for sunburn.  Patient instructed to avoid sunlight, if possible.  When exposed to sunlight, patients should wear protective clothing, sunglasses, and sunscreen.  The patient was instructed to call the office immediately if the following severe adverse effects occur:  hearing changes, easy bruising/bleeding, severe headache, or vision changes.  The patient verbalized understanding of the proper use and possible adverse effects of doxycycline.  All of the patient's questions and concerns were addressed.
Aklief Pregnancy And Lactation Text: It is unknown if this medication is safe to use during pregnancy.  It is unknown if this medication is excreted in breast milk.  Breastfeeding women should use the topical cream on the smallest area of the skin for the shortest time needed while breastfeeding.  Do not apply to nipple and areola.
Aklief counseling:  Patient advised to apply a pea-sized amount only at bedtime and wait 30 minutes after washing their face before applying.  If too drying, patient may add a non-comedogenic moisturizer.  The most commonly reported side effects including irritation, redness, scaling, dryness, stinging, burning, itching, and increased risk of sunburn.  The patient verbalized understanding of the proper use and possible adverse effects of retinoids.  All of the patient's questions and concerns were addressed.
Azelaic Acid Counseling: Patient counseled that medicine may cause skin irritation and to avoid applying near the eyes.  In the event of skin irritation, the patient was advised to reduce the amount of the drug applied or use it less frequently.   The patient verbalized understanding of the proper use and possible adverse effects of azelaic acid.  All of the patient's questions and concerns were addressed.
Winlevi Pregnancy And Lactation Text: This medication is considered safe during pregnancy and breastfeeding.
Winlevi Counseling:  I discussed with the patient the risks of topical clascoterone including but not limited to erythema, scaling, itching, and stinging. Patient voiced their understanding.
Azelaic Acid Pregnancy And Lactation Text: This medication is considered safe during pregnancy and breast feeding.
Detail Level: Detailed

## 2022-08-18 NOTE — HPI: SKIN LESION
How Severe Is Your Skin Lesion?: mild
Is This A New Presentation, Or A Follow-Up?: Skin Lesion
Additional History: Patient stated there is a lesion on her right leg she has noticed. She stated it has been present for several years.

## 2023-03-07 ENCOUNTER — APPOINTMENT (RX ONLY)
Dept: URBAN - METROPOLITAN AREA CLINIC 330 | Facility: CLINIC | Age: 58
Setting detail: DERMATOLOGY
End: 2023-03-07

## 2023-03-07 DIAGNOSIS — L70.0 ACNE VULGARIS: ICD-10-CM | Status: INADEQUATELY CONTROLLED

## 2023-03-07 PROCEDURE — ? PRESCRIPTION MEDICATION MANAGEMENT

## 2023-03-07 PROCEDURE — ? COUNSELING

## 2023-03-07 PROCEDURE — 99214 OFFICE O/P EST MOD 30 MIN: CPT

## 2023-03-07 PROCEDURE — ? ADDITIONAL NOTES

## 2023-03-07 PROCEDURE — ? PRESCRIPTION

## 2023-03-07 PROCEDURE — ? FULL BODY SKIN EXAM - DECLINED

## 2023-03-07 RX ORDER — MINOCYCLINE HYDROCHLORIDE 50 MG/1
CAPSULE ORAL
Qty: 60 | Refills: 3 | Status: ERX | COMMUNITY
Start: 2023-03-07

## 2023-03-07 RX ORDER — CLASCOTERONE 1 G/100G
CREAM TOPICAL
Qty: 60 | Refills: 3 | Status: ERX | COMMUNITY
Start: 2023-03-07

## 2023-03-07 RX ADMIN — MINOCYCLINE HYDROCHLORIDE: 50 CAPSULE ORAL at 00:00

## 2023-03-07 RX ADMIN — CLASCOTERONE: 1 CREAM TOPICAL at 00:00

## 2023-03-07 ASSESSMENT — LOCATION SIMPLE DESCRIPTION DERM
LOCATION SIMPLE: RIGHT CHEEK
LOCATION SIMPLE: CHIN
LOCATION SIMPLE: LEFT CHEEK

## 2023-03-07 ASSESSMENT — LOCATION ZONE DERM: LOCATION ZONE: FACE

## 2023-03-07 NOTE — PROCEDURE: PRESCRIPTION MEDICATION MANAGEMENT
Detail Level: Zone
Initiate Treatment: Winlevi bid to face \\n\\nMinocycline 50mg once daily, increase to bid for flares
Render In Strict Bullet Format?: No

## 2023-03-07 NOTE — PROCEDURE: COUNSELING
Topical Sulfur Applications Pregnancy And Lactation Text: This medication is Pregnancy Category C and has an unknown safety profile during pregnancy. It is unknown if this topical medication is excreted in breast milk.
Include Pregnancy/Lactation Warning?: No
Azithromycin Pregnancy And Lactation Text: This medication is considered safe during pregnancy and is also secreted in breast milk.
Topical Clindamycin Counseling: Patient counseled that this medication may cause skin irritation or allergic reactions.  In the event of skin irritation, the patient was advised to reduce the amount of the drug applied or use it less frequently.   The patient verbalized understanding of the proper use and possible adverse effects of clindamycin.  All of the patient's questions and concerns were addressed.
Tazorac Counseling:  Patient advised that medication is irritating and drying.  Patient may need to apply sparingly and wash off after an hour before eventually leaving it on overnight.  The patient verbalized understanding of the proper use and possible adverse effects of tazorac.  All of the patient's questions and concerns were addressed.
Doxycycline Pregnancy And Lactation Text: This medication is Pregnancy Category D and not consider safe during pregnancy. It is also excreted in breast milk but is considered safe for shorter treatment courses.
Sarecycline Counseling: Patient advised regarding possible photosensitivity and discoloration of the teeth, skin, lips, tongue and gums.  Patient instructed to avoid sunlight, if possible.  When exposed to sunlight, patients should wear protective clothing, sunglasses, and sunscreen.  The patient was instructed to call the office immediately if the following severe adverse effects occur:  hearing changes, easy bruising/bleeding, severe headache, or vision changes.  The patient verbalized understanding of the proper use and possible adverse effects of sarecycline.  All of the patient's questions and concerns were addressed.
Spironolactone Counseling: Patient advised regarding risks of diarrhea, abdominal pain, hyperkalemia, birth defects (for female patients), liver toxicity and renal toxicity. The patient may need blood work to monitor liver and kidney function and potassium levels while on therapy. The patient verbalized understanding of the proper use and possible adverse effects of spironolactone.  All of the patient's questions and concerns were addressed.
Spironolactone Pregnancy And Lactation Text: This medication can cause feminization of the male fetus and should be avoided during pregnancy. The active metabolite is also found in breast milk.
Topical Sulfur Applications Counseling: Topical Sulfur Counseling: Patient counseled that this medication may cause skin irritation or allergic reactions.  In the event of skin irritation, the patient was advised to reduce the amount of the drug applied or use it less frequently.   The patient verbalized understanding of the proper use and possible adverse effects of topical sulfur application.  All of the patient's questions and concerns were addressed.
Minocycline Counseling: Patient advised regarding possible photosensitivity and discoloration of the teeth, skin, lips, tongue and gums.  Patient instructed to avoid sunlight, if possible.  When exposed to sunlight, patients should wear protective clothing, sunglasses, and sunscreen.  The patient was instructed to call the office immediately if the following severe adverse effects occur:  hearing changes, easy bruising/bleeding, severe headache, or vision changes.  The patient verbalized understanding of the proper use and possible adverse effects of minocycline.  All of the patient's questions and concerns were addressed.
Topical Retinoid Pregnancy And Lactation Text: This medication is Pregnancy Category C. It is unknown if this medication is excreted in breast milk.
Birth Control Pills Counseling: Birth Control Pill Counseling: I discussed with the patient the potential side effects of OCPs including but not limited to increased risk of stroke, heart attack, thrombophlebitis, deep venous thrombosis, hepatic adenomas, breast changes, GI upset, headaches, and depression.  The patient verbalized understanding of the proper use and possible adverse effects of OCPs. All of the patient's questions and concerns were addressed.
Tazorac Pregnancy And Lactation Text: This medication is not safe during pregnancy. It is unknown if this medication is excreted in breast milk.
Bactrim Pregnancy And Lactation Text: This medication is Pregnancy Category D and is known to cause fetal risk.  It is also excreted in breast milk.
High Dose Vitamin A Counseling: Side effects reviewed, pt to contact office should one occur.
High Dose Vitamin A Pregnancy And Lactation Text: High dose vitamin A therapy is contraindicated during pregnancy and breast feeding.
Topical Clindamycin Pregnancy And Lactation Text: This medication is Pregnancy Category B and is considered safe during pregnancy. It is unknown if it is excreted in breast milk.
Erythromycin Counseling:  I discussed with the patient the risks of erythromycin including but not limited to GI upset, allergic reaction, drug rash, diarrhea, increase in liver enzymes, and yeast infections.
Isotretinoin Counseling: Patient should get monthly blood tests, not donate blood, not drive at night if vision affected, not share medication, and not undergo elective surgery for 6 months after tx completed. Side effects reviewed, pt to contact office should one occur.
Minocycline Pregnancy And Lactation Text: This medication is Pregnancy Category D and not consider safe during pregnancy. It is also excreted in breast milk.
Azithromycin Counseling:  I discussed with the patient the risks of azithromycin including but not limited to GI upset, allergic reaction, drug rash, diarrhea, and yeast infections.
Benzoyl Peroxide Counseling: Patient counseled that medicine may cause skin irritation and bleach clothing.  In the event of skin irritation, the patient was advised to reduce the amount of the drug applied or use it less frequently.   The patient verbalized understanding of the proper use and possible adverse effects of benzoyl peroxide.  All of the patient's questions and concerns were addressed.
Isotretinoin Pregnancy And Lactation Text: This medication is Pregnancy Category X and is considered extremely dangerous during pregnancy. It is unknown if it is excreted in breast milk.
Benzoyl Peroxide Pregnancy And Lactation Text: This medication is Pregnancy Category C. It is unknown if benzoyl peroxide is excreted in breast milk.
Topical Retinoid counseling:  Patient advised to apply a pea-sized amount only at bedtime and wait 30 minutes after washing their face before applying.  If too drying, patient may add a non-comedogenic moisturizer. The patient verbalized understanding of the proper use and possible adverse effects of retinoids.  All of the patient's questions and concerns were addressed.
Detail Level: Zone
Birth Control Pills Pregnancy And Lactation Text: This medication should be avoided if pregnant and for the first 30 days post-partum.
Erythromycin Pregnancy And Lactation Text: This medication is Pregnancy Category B and is considered safe during pregnancy. It is also excreted in breast milk.
Bactrim Counseling:  I discussed with the patient the risks of sulfa antibiotics including but not limited to GI upset, allergic reaction, drug rash, diarrhea, dizziness, photosensitivity, and yeast infections.  Rarely, more serious reactions can occur including but not limited to aplastic anemia, agranulocytosis, methemoglobinemia, blood dyscrasias, liver or kidney failure, lung infiltrates or desquamative/blistering drug rashes.
Tetracycline Counseling: Patient counseled regarding possible photosensitivity and increased risk for sunburn.  Patient instructed to avoid sunlight, if possible.  When exposed to sunlight, patients should wear protective clothing, sunglasses, and sunscreen.  The patient was instructed to call the office immediately if the following severe adverse effects occur:  hearing changes, easy bruising/bleeding, severe headache, or vision changes.  The patient verbalized understanding of the proper use and possible adverse effects of tetracycline.  All of the patient's questions and concerns were addressed. Patient understands to avoid pregnancy while on therapy due to potential birth defects.
Dapsone Counseling: I discussed with the patient the risks of dapsone including but not limited to hemolytic anemia, agranulocytosis, rashes, methemoglobinemia, kidney failure, peripheral neuropathy, headaches, GI upset, and liver toxicity.  Patients who start dapsone require monitoring including baseline LFTs and weekly CBCs for the first month, then every month thereafter.  The patient verbalized understanding of the proper use and possible adverse effects of dapsone.  All of the patient's questions and concerns were addressed.
Dapsone Pregnancy And Lactation Text: This medication is Pregnancy Category C and is not considered safe during pregnancy or breast feeding.
Doxycycline Counseling:  Patient counseled regarding possible photosensitivity and increased risk for sunburn.  Patient instructed to avoid sunlight, if possible.  When exposed to sunlight, patients should wear protective clothing, sunglasses, and sunscreen.  The patient was instructed to call the office immediately if the following severe adverse effects occur:  hearing changes, easy bruising/bleeding, severe headache, or vision changes.  The patient verbalized understanding of the proper use and possible adverse effects of doxycycline.  All of the patient's questions and concerns were addressed.
Aklief Pregnancy And Lactation Text: It is unknown if this medication is safe to use during pregnancy.  It is unknown if this medication is excreted in breast milk.  Breastfeeding women should use the topical cream on the smallest area of the skin for the shortest time needed while breastfeeding.  Do not apply to nipple and areola.
Aklief counseling:  Patient advised to apply a pea-sized amount only at bedtime and wait 30 minutes after washing their face before applying.  If too drying, patient may add a non-comedogenic moisturizer.  The most commonly reported side effects including irritation, redness, scaling, dryness, stinging, burning, itching, and increased risk of sunburn.  The patient verbalized understanding of the proper use and possible adverse effects of retinoids.  All of the patient's questions and concerns were addressed.
Azelaic Acid Counseling: Patient counseled that medicine may cause skin irritation and to avoid applying near the eyes.  In the event of skin irritation, the patient was advised to reduce the amount of the drug applied or use it less frequently.   The patient verbalized understanding of the proper use and possible adverse effects of azelaic acid.  All of the patient's questions and concerns were addressed.
Winlevi Pregnancy And Lactation Text: This medication is considered safe during pregnancy and breastfeeding.
Winlevi Counseling:  I discussed with the patient the risks of topical clascoterone including but not limited to erythema, scaling, itching, and stinging. Patient voiced their understanding.
Azelaic Acid Pregnancy And Lactation Text: This medication is considered safe during pregnancy and breast feeding.

## 2023-03-07 NOTE — PROCEDURE: ADDITIONAL NOTES
Render Risk Assessment In Note?: no
Detail Level: Simple
Additional Notes: Pt potassium levels became highly elevated when she was on Spironolactone. Pt currently takes medications for hormone replacement. \\n\\nDiscussed accutane. Pt has high deductible insurance & accutane will be too expensive. Both of pt daughters were on accutane. \\n\\nDiscussed low dose antibiotics for maintenance & winlevi. Pt comfortable with plan\\n\\nPt interested in microneedling. Emphasized with pt that microneedling treats scars & not acne itself.

## 2023-05-04 ENCOUNTER — RX ONLY (OUTPATIENT)
Age: 58
Setting detail: RX ONLY
End: 2023-05-04

## 2023-05-04 RX ORDER — MINOCYCLINE HYDROCHLORIDE 50 MG/1
CAPSULE ORAL
Qty: 60 | Refills: 3 | Status: ERX

## 2023-05-04 RX ORDER — MINOCYCLINE HYDROCHLORIDE 50 MG/1
CAPSULE ORAL
Qty: 60 | Refills: 3 | Status: CANCELLED